# Patient Record
Sex: FEMALE | Race: WHITE | Employment: FULL TIME | URBAN - NONMETROPOLITAN AREA
[De-identification: names, ages, dates, MRNs, and addresses within clinical notes are randomized per-mention and may not be internally consistent; named-entity substitution may affect disease eponyms.]

---

## 2017-01-17 ENCOUNTER — TELEPHONE (OUTPATIENT)
Dept: NEUROLOGY | Age: 37
End: 2017-01-17

## 2017-01-19 ENCOUNTER — TELEPHONE (OUTPATIENT)
Dept: NEUROLOGY | Age: 37
End: 2017-01-19

## 2017-01-26 ENCOUNTER — TELEPHONE (OUTPATIENT)
Dept: NEUROSURGERY | Age: 37
End: 2017-01-26

## 2017-01-31 ENCOUNTER — OFFICE VISIT (OUTPATIENT)
Dept: NEUROSURGERY | Age: 37
End: 2017-01-31
Payer: COMMERCIAL

## 2017-01-31 VITALS
HEIGHT: 62 IN | WEIGHT: 202.2 LBS | DIASTOLIC BLOOD PRESSURE: 84 MMHG | SYSTOLIC BLOOD PRESSURE: 120 MMHG | BODY MASS INDEX: 37.21 KG/M2 | HEART RATE: 68 BPM

## 2017-01-31 DIAGNOSIS — R56.9 CONVULSIONS, UNSPECIFIED CONVULSION TYPE (HCC): ICD-10-CM

## 2017-01-31 DIAGNOSIS — R51.9 INTRACTABLE EPISODIC HEADACHE, UNSPECIFIED HEADACHE TYPE: Primary | ICD-10-CM

## 2017-01-31 PROCEDURE — 99214 OFFICE O/P EST MOD 30 MIN: CPT | Performed by: PSYCHIATRY & NEUROLOGY

## 2017-02-11 ENCOUNTER — HOSPITAL ENCOUNTER (EMERGENCY)
Age: 37
Discharge: HOME | End: 2017-02-11
Attending: EMERGENCY MEDICINE
Payer: COMMERCIAL

## 2017-02-11 ENCOUNTER — APPOINTMENT (OUTPATIENT)
Dept: CT IMAGING | Age: 37
End: 2017-02-11
Payer: COMMERCIAL

## 2017-02-11 VITALS
HEART RATE: 93 BPM | RESPIRATION RATE: 18 BRPM | BODY MASS INDEX: 37.17 KG/M2 | SYSTOLIC BLOOD PRESSURE: 127 MMHG | OXYGEN SATURATION: 95 % | TEMPERATURE: 98.6 F | HEIGHT: 62 IN | WEIGHT: 202 LBS | DIASTOLIC BLOOD PRESSURE: 78 MMHG

## 2017-02-11 DIAGNOSIS — G43.809 OTHER MIGRAINE WITHOUT STATUS MIGRAINOSUS, NOT INTRACTABLE: Primary | ICD-10-CM

## 2017-02-11 DIAGNOSIS — R55 SYNCOPE AND COLLAPSE: ICD-10-CM

## 2017-02-11 LAB
ALBUMIN SERPL-MCNC: 4.3 G/DL (ref 3.5–5.2)
ALP BLD-CCNC: 123 U/L (ref 35–104)
ALT SERPL-CCNC: 26 U/L (ref 5–33)
ANION GAP SERPL CALCULATED.3IONS-SCNC: 13 MMOL/L (ref 7–19)
AST SERPL-CCNC: 23 U/L (ref 5–32)
BASOPHILS ABSOLUTE: 0 K/UL (ref 0–0.2)
BASOPHILS RELATIVE PERCENT: 0.4 % (ref 0–1)
BILIRUB SERPL-MCNC: <0.2 MG/DL (ref 0.2–1.2)
BILIRUBIN URINE: NEGATIVE
BLOOD, URINE: NEGATIVE
BUN BLDV-MCNC: 13 MG/DL (ref 6–20)
CALCIUM SERPL-MCNC: 9.1 MG/DL (ref 8.6–10)
CHLORIDE BLD-SCNC: 100 MMOL/L (ref 98–111)
CLARITY: CLEAR
CO2: 25 MMOL/L (ref 22–29)
COLOR: YELLOW
CREAT SERPL-MCNC: 0.7 MG/DL (ref 0.5–0.9)
EOSINOPHILS ABSOLUTE: 0.3 K/UL (ref 0–0.6)
EOSINOPHILS RELATIVE PERCENT: 3 % (ref 0–5)
GFR NON-AFRICAN AMERICAN: >60
GLOBULIN: 3.3 G/DL
GLUCOSE BLD-MCNC: 85 MG/DL (ref 74–109)
GLUCOSE URINE: NEGATIVE MG/DL
HCT VFR BLD CALC: 47.1 % (ref 37–47)
HEMOGLOBIN: 15.2 G/DL (ref 12–16)
KETONES, URINE: NEGATIVE MG/DL
LEUKOCYTE ESTERASE, URINE: NEGATIVE
LYMPHOCYTES ABSOLUTE: 2.4 K/UL (ref 1.1–4.5)
LYMPHOCYTES RELATIVE PERCENT: 25.3 % (ref 20–40)
MCH RBC QN AUTO: 28.6 PG (ref 27–31)
MCHC RBC AUTO-ENTMCNC: 32.3 G/DL (ref 33–37)
MCV RBC AUTO: 88.7 FL (ref 81–99)
MONOCYTES ABSOLUTE: 0.7 K/UL (ref 0–0.9)
MONOCYTES RELATIVE PERCENT: 7.6 % (ref 0–10)
NEUTROPHILS ABSOLUTE: 5.9 K/UL (ref 1.5–7.5)
NEUTROPHILS RELATIVE PERCENT: 63.6 % (ref 50–65)
NITRITE, URINE: NEGATIVE
PDW BLD-RTO: 13 % (ref 11.5–14.5)
PH UA: 7.5
PLATELET # BLD: 293 K/UL (ref 130–400)
PMV BLD AUTO: 11 FL (ref 7.4–10.4)
POTASSIUM SERPL-SCNC: 3.9 MMOL/L (ref 3.5–5)
PROTEIN UA: NEGATIVE MG/DL
RBC # BLD: 5.31 M/UL (ref 4.2–5.4)
SODIUM BLD-SCNC: 138 MMOL/L (ref 136–145)
SPECIFIC GRAVITY UA: 1.03
TOTAL PROTEIN: 7.6 G/DL (ref 6.6–8.7)
UROBILINOGEN, URINE: 0.2 E.U./DL
WBC # BLD: 9.3 K/UL (ref 4.8–10.8)

## 2017-02-11 PROCEDURE — 70450 CT HEAD/BRAIN W/O DYE: CPT

## 2017-02-11 PROCEDURE — 85025 COMPLETE CBC W/AUTO DIFF WBC: CPT

## 2017-02-11 PROCEDURE — 2580000003 HC RX 258: Performed by: EMERGENCY MEDICINE

## 2017-02-11 PROCEDURE — 36415 COLL VENOUS BLD VENIPUNCTURE: CPT

## 2017-02-11 PROCEDURE — 99283 EMERGENCY DEPT VISIT LOW MDM: CPT | Performed by: EMERGENCY MEDICINE

## 2017-02-11 PROCEDURE — 99284 EMERGENCY DEPT VISIT MOD MDM: CPT

## 2017-02-11 PROCEDURE — 96374 THER/PROPH/DIAG INJ IV PUSH: CPT

## 2017-02-11 PROCEDURE — 6360000002 HC RX W HCPCS: Performed by: EMERGENCY MEDICINE

## 2017-02-11 PROCEDURE — 96375 TX/PRO/DX INJ NEW DRUG ADDON: CPT

## 2017-02-11 PROCEDURE — 81003 URINALYSIS AUTO W/O SCOPE: CPT

## 2017-02-11 PROCEDURE — 93005 ELECTROCARDIOGRAM TRACING: CPT

## 2017-02-11 PROCEDURE — 80053 COMPREHEN METABOLIC PANEL: CPT

## 2017-02-11 RX ORDER — 0.9 % SODIUM CHLORIDE 0.9 %
1000 INTRAVENOUS SOLUTION INTRAVENOUS ONCE
Status: COMPLETED | OUTPATIENT
Start: 2017-02-11 | End: 2017-02-11

## 2017-02-11 RX ORDER — KETOROLAC TROMETHAMINE 30 MG/ML
30 INJECTION, SOLUTION INTRAMUSCULAR; INTRAVENOUS ONCE
Status: COMPLETED | OUTPATIENT
Start: 2017-02-11 | End: 2017-02-11

## 2017-02-11 RX ORDER — DIPHENHYDRAMINE HYDROCHLORIDE 50 MG/ML
12.5 INJECTION INTRAMUSCULAR; INTRAVENOUS ONCE
Status: COMPLETED | OUTPATIENT
Start: 2017-02-11 | End: 2017-02-11

## 2017-02-11 RX ORDER — MORPHINE SULFATE 4 MG/ML
4 INJECTION, SOLUTION INTRAMUSCULAR; INTRAVENOUS ONCE
Status: COMPLETED | OUTPATIENT
Start: 2017-02-11 | End: 2017-02-11

## 2017-02-11 RX ORDER — DEXAMETHASONE SODIUM PHOSPHATE 10 MG/ML
10 INJECTION INTRAMUSCULAR; INTRAVENOUS ONCE
Status: COMPLETED | OUTPATIENT
Start: 2017-02-11 | End: 2017-02-11

## 2017-02-11 RX ADMIN — DEXAMETHASONE SODIUM PHOSPHATE 10 MG: 10 INJECTION INTRAMUSCULAR; INTRAVENOUS at 19:47

## 2017-02-11 RX ADMIN — PROCHLORPERAZINE EDISYLATE 10 MG: 5 INJECTION INTRAMUSCULAR; INTRAVENOUS at 18:40

## 2017-02-11 RX ADMIN — DIPHENHYDRAMINE HYDROCHLORIDE 12.5 MG: 50 INJECTION, SOLUTION INTRAMUSCULAR; INTRAVENOUS at 18:39

## 2017-02-11 RX ADMIN — MORPHINE SULFATE 4 MG: 4 INJECTION, SOLUTION INTRAMUSCULAR; INTRAVENOUS at 19:47

## 2017-02-11 RX ADMIN — KETOROLAC TROMETHAMINE 30 MG: 30 INJECTION, SOLUTION INTRAMUSCULAR at 18:39

## 2017-02-11 RX ADMIN — SODIUM CHLORIDE 1000 ML: 9 INJECTION, SOLUTION INTRAVENOUS at 18:39

## 2017-02-11 ASSESSMENT — PAIN DESCRIPTION - DESCRIPTORS: DESCRIPTORS: SHARP

## 2017-02-11 ASSESSMENT — PAIN SCALES - GENERAL
PAINLEVEL_OUTOF10: 8

## 2017-02-11 ASSESSMENT — PAIN DESCRIPTION - LOCATION: LOCATION: HEAD

## 2017-02-12 ENCOUNTER — PATIENT MESSAGE (OUTPATIENT)
Dept: NEUROSURGERY | Age: 37
End: 2017-02-12

## 2017-02-12 ASSESSMENT — ENCOUNTER SYMPTOMS
SHORTNESS OF BREATH: 0
ABDOMINAL PAIN: 0

## 2017-02-14 LAB
EKG P AXIS: 46 DEGREES
EKG P-R INTERVAL: 132 MS
EKG Q-T INTERVAL: 342 MS
EKG QRS DURATION: 84 MS
EKG QTC CALCULATION (BAZETT): 419 MS
EKG T AXIS: 31 DEGREES

## 2017-02-15 ENCOUNTER — HOSPITAL ENCOUNTER (OUTPATIENT)
Dept: NEUROLOGY | Age: 37
Discharge: HOME | End: 2017-02-15
Payer: COMMERCIAL

## 2017-02-15 PROCEDURE — 95819 EEG AWAKE AND ASLEEP: CPT | Performed by: PSYCHIATRY & NEUROLOGY

## 2017-02-15 PROCEDURE — 95819 EEG AWAKE AND ASLEEP: CPT

## 2017-02-24 ENCOUNTER — TELEPHONE (OUTPATIENT)
Dept: NEUROSURGERY | Age: 37
End: 2017-02-24

## 2017-03-14 ENCOUNTER — OFFICE VISIT (OUTPATIENT)
Dept: NEUROSURGERY | Age: 37
End: 2017-03-14
Payer: COMMERCIAL

## 2017-03-14 VITALS
SYSTOLIC BLOOD PRESSURE: 118 MMHG | DIASTOLIC BLOOD PRESSURE: 88 MMHG | HEIGHT: 62 IN | WEIGHT: 204.4 LBS | HEART RATE: 76 BPM | BODY MASS INDEX: 37.61 KG/M2

## 2017-03-14 DIAGNOSIS — R51.9 INTRACTABLE EPISODIC HEADACHE, UNSPECIFIED HEADACHE TYPE: Primary | ICD-10-CM

## 2017-03-14 DIAGNOSIS — R56.9 CONVULSIONS, UNSPECIFIED CONVULSION TYPE (HCC): ICD-10-CM

## 2017-03-14 PROCEDURE — 99214 OFFICE O/P EST MOD 30 MIN: CPT | Performed by: PSYCHIATRY & NEUROLOGY

## 2017-03-15 ENCOUNTER — TELEPHONE (OUTPATIENT)
Dept: NEUROLOGY | Age: 37
End: 2017-03-15

## 2017-05-17 ENCOUNTER — TELEPHONE (OUTPATIENT)
Dept: NEUROLOGY | Age: 37
End: 2017-05-17

## 2017-05-17 ENCOUNTER — OFFICE VISIT (OUTPATIENT)
Dept: NEUROSURGERY | Age: 37
End: 2017-05-17
Payer: COMMERCIAL

## 2017-05-17 VITALS
OXYGEN SATURATION: 99 % | HEIGHT: 62 IN | HEART RATE: 90 BPM | BODY MASS INDEX: 36.62 KG/M2 | WEIGHT: 199 LBS | SYSTOLIC BLOOD PRESSURE: 114 MMHG | DIASTOLIC BLOOD PRESSURE: 79 MMHG

## 2017-05-17 DIAGNOSIS — R56.9 CONVULSIONS, UNSPECIFIED CONVULSION TYPE (HCC): ICD-10-CM

## 2017-05-17 DIAGNOSIS — R51.9 INTRACTABLE EPISODIC HEADACHE, UNSPECIFIED HEADACHE TYPE: Primary | ICD-10-CM

## 2017-05-17 PROCEDURE — 99214 OFFICE O/P EST MOD 30 MIN: CPT | Performed by: PSYCHIATRY & NEUROLOGY

## 2017-05-17 RX ORDER — GABAPENTIN 300 MG/1
300 CAPSULE ORAL 3 TIMES DAILY
Qty: 90 CAPSULE | Refills: 5 | Status: SHIPPED | OUTPATIENT
Start: 2017-05-17 | End: 2017-10-18 | Stop reason: SDUPTHER

## 2017-05-18 ENCOUNTER — TELEPHONE (OUTPATIENT)
Dept: NEUROSURGERY | Age: 37
End: 2017-05-18

## 2017-05-23 ENCOUNTER — TELEPHONE (OUTPATIENT)
Dept: NEUROLOGY | Age: 37
End: 2017-05-23

## 2017-06-01 ENCOUNTER — TRANSCRIBE ORDERS (OUTPATIENT)
Dept: GENERAL RADIOLOGY | Facility: HOSPITAL | Age: 37
End: 2017-06-01

## 2017-06-01 ENCOUNTER — HOSPITAL ENCOUNTER (OUTPATIENT)
Dept: GENERAL RADIOLOGY | Facility: HOSPITAL | Age: 37
Discharge: HOME | End: 2017-06-01
Attending: INTERNAL MEDICINE | Admitting: INTERNAL MEDICINE

## 2017-06-01 DIAGNOSIS — M72.2 PLANTAR FASCIAL FIBROMATOSIS: Primary | ICD-10-CM

## 2017-06-01 PROCEDURE — 73620 X-RAY EXAM OF FOOT: CPT

## 2017-06-01 PROCEDURE — 73600 X-RAY EXAM OF ANKLE: CPT

## 2017-06-07 RX ORDER — VERAPAMIL HYDROCHLORIDE 40 MG/1
80 TABLET ORAL 2 TIMES DAILY
Qty: 120 TABLET | Refills: 5 | Status: SHIPPED | OUTPATIENT
Start: 2017-06-07 | End: 2017-12-02 | Stop reason: SDUPTHER

## 2017-06-21 ENCOUNTER — OFFICE VISIT (OUTPATIENT)
Dept: PODIATRY | Facility: CLINIC | Age: 37
End: 2017-06-21

## 2017-06-21 VITALS
HEART RATE: 82 BPM | WEIGHT: 204 LBS | BODY MASS INDEX: 37.54 KG/M2 | OXYGEN SATURATION: 98 % | DIASTOLIC BLOOD PRESSURE: 60 MMHG | SYSTOLIC BLOOD PRESSURE: 108 MMHG | HEIGHT: 62 IN

## 2017-06-21 DIAGNOSIS — M72.2 PLANTAR FASCIITIS, BILATERAL: Primary | ICD-10-CM

## 2017-06-21 PROCEDURE — 99203 OFFICE O/P NEW LOW 30 MIN: CPT | Performed by: PODIATRIST

## 2017-06-21 NOTE — PATIENT INSTRUCTIONS
Plantar Fasciitis With Rehab  The plantar fascia is a fibrous, ligament-like, soft-tissue structure that spans the bottom of the foot. Plantar fasciitis, also called heel spur syndrome, is a condition that causes pain in the foot due to inflammation of the tissue.  SYMPTOMS   · Pain and tenderness on the underneath side of the foot.  · Pain that worsens with standing or walking.  CAUSES   Plantar fasciitis is caused by irritation and injury to the plantar fascia on the underneath side of the foot. Common mechanisms of injury include:  · Direct trauma to bottom of the foot.  · Damage to a small nerve that runs under the foot where the main fascia attaches to the heel bone.  · Stress placed on the plantar fascia due to bone spurs.  RISK INCREASES WITH:   · Activities that place stress on the plantar fascia (running, jumping, pivoting, or cutting).  · Poor strength and flexibility.  · Improperly fitted shoes.  · Tight calf muscles.  · Flat feet.  · Failure to warm-up properly before activity.  · Obesity.  PREVENTION  · Warm up and stretch properly before activity.  · Allow for adequate recovery between workouts.  · Maintain physical fitness:    Strength, flexibility, and endurance.    Cardiovascular fitness.  · Maintain a health body weight.  · Avoid stress on the plantar fascia.  · Wear properly fitted shoes, including arch supports for individuals who have flat feet.  PROGNOSIS   If treated properly, then the symptoms of plantar fasciitis usually resolve without surgery. However, occasionally surgery is necessary.  RELATED COMPLICATIONS   · Recurrent symptoms that may result in a chronic condition.  · Problems of the lower back that are caused by compensating for the injury, such as limping.  · Pain or weakness of the foot during push-off following surgery.  · Chronic inflammation, scarring, and partial or complete fascia tear, occurring more often from repeated injections.  TREATMENT   Treatment initially involves  the use of ice and medication to help reduce pain and inflammation. The use of strengthening and stretching exercises may help reduce pain with activity, especially stretches of the Achilles tendon. These exercises may be performed at home or with a therapist. Your caregiver may recommend that you use heel cups of arch supports to help reduce stress on the plantar fascia. Occasionally, corticosteroid injections are given to reduce inflammation. If symptoms persist for greater than 6 months despite non-surgical (conservative), then surgery may be recommended.   MEDICATION   · If pain medication is necessary, then nonsteroidal anti-inflammatory medications, such as aspirin and ibuprofen, or other minor pain relievers, such as acetaminophen, are often recommended.  · Do not take pain medication within 7 days before surgery.  · Prescription pain relievers may be given if deemed necessary by your caregiver. Use only as directed and only as much as you need.  · Corticosteroid injections may be given by your caregiver. These injections should be reserved for the most serious cases, because they may only be given a certain number of times.  HEAT AND COLD  · Cold treatment (icing) relieves pain and reduces inflammation. Cold treatment should be applied for 10 to 15 minutes every 2 to 3 hours for inflammation and pain and immediately after any activity that aggravates your symptoms. Use ice packs or massage the area with a piece of ice (ice massage).  · Heat treatment may be used prior to performing the stretching and strengthening activities prescribed by your caregiver, physical therapist, or . Use a heat pack or soak the injury in warm water.  SEEK IMMEDIATE MEDICAL CARE IF:  · Treatment seems to offer no benefit, or the condition worsens.  · Any medications produce adverse side effects.  EXERCISES  RANGE OF MOTION (ROM) AND STRETCHING EXERCISES - Plantar Fasciitis (Heel Spur Syndrome)  These exercises may  help you when beginning to rehabilitate your injury. Your symptoms may resolve with or without further involvement from your physician, physical therapist or . While completing these exercises, remember:   · Restoring tissue flexibility helps normal motion to return to the joints. This allows healthier, less painful movement and activity.  · An effective stretch should be held for at least 30 seconds.  · A stretch should never be painful. You should only feel a gentle lengthening or release in the stretched tissue.  RANGE OF MOTION - Toe Extension, Flexion  · Sit with your right / left leg crossed over your opposite knee.  · Grasp your toes and gently pull them back toward the top of your foot. You should feel a stretch on the bottom of your toes and/or foot.  · Hold this stretch for __________ seconds.  · Now, gently pull your toes toward the bottom of your foot. You should feel a stretch on the top of your toes and or foot.  · Hold this stretch for __________ seconds.  Repeat __________ times. Complete this stretch __________ times per day.   RANGE OF MOTION - Ankle Dorsiflexion, Active Assisted  · Remove shoes and sit on a chair that is preferably not on a carpeted surface.  · Place right / left foot under knee. Extend your opposite leg for support.  · Keeping your heel down, slide your right / left foot back toward the chair until you feel a stretch at your ankle or calf. If you do not feel a stretch, slide your bottom forward to the edge of the chair, while still keeping your heel down.  · Hold this stretch for __________ seconds.  Repeat __________ times. Complete this stretch __________ times per day.   STRETCH - Gastroc, Standing  · Place hands on wall.  · Extend right / left leg, keeping the front knee somewhat bent.  · Slightly point your toes inward on your back foot.  · Keeping your right / left heel on the floor and your knee straight, shift your weight toward the wall, not allowing your  back to arch.  · You should feel a gentle stretch in the right / left calf. Hold this position for __________ seconds.  Repeat __________ times. Complete this stretch __________ times per day.  STRETCH - Soleus, Standing  · Place hands on wall.  · Extend right / left leg, keeping the other knee somewhat bent.  · Slightly point your toes inward on your back foot.  · Keep your right / left heel on the floor, bend your back knee, and slightly shift your weight over the back leg so that you feel a gentle stretch deep in your back calf.  · Hold this position for __________ seconds.  Repeat __________ times. Complete this stretch __________ times per day.  STRETCH - Gastrocsoleus, Standing   Note: This exercise can place a lot of stress on your foot and ankle. Please complete this exercise only if specifically instructed by your caregiver.   · Place the ball of your right / left foot on a step, keeping your other foot firmly on the same step.  · Hold on to the wall or a rail for balance.  · Slowly lift your other foot, allowing your body weight to press your heel down over the edge of the step.  · You should feel a stretch in your right / left calf.  · Hold this position for __________ seconds.  · Repeat this exercise with a slight bend in your right / left knee.  Repeat __________ times. Complete this stretch __________ times per day.   STRENGTHENING EXERCISES - Plantar Fasciitis (Heel Spur Syndrome)   These exercises may help you when beginning to rehabilitate your injury. They may resolve your symptoms with or without further involvement from your physician, physical therapist or . While completing these exercises, remember:   · Muscles can gain both the endurance and the strength needed for everyday activities through controlled exercises.  · Complete these exercises as instructed by your physician, physical therapist or . Progress the resistance and repetitions only as  guided.  STRENGTH - Towel Curls  · Sit in a chair positioned on a non-carpeted surface.  · Place your foot on a towel, keeping your heel on the floor.  · Pull the towel toward your heel by only curling your toes. Keep your heel on the floor.  · If instructed by your physician, physical therapist or , add ____________________ at the end of the towel.  Repeat __________ times. Complete this exercise __________ times per day.  STRENGTH - Ankle Inversion  · Secure one end of a rubber exercise band/tubing to a fixed object (table, pole). Loop the other end around your foot just before your toes.  · Place your fists between your knees. This will focus your strengthening at your ankle.  · Slowly, pull your big toe up and in, making sure the band/tubing is positioned to resist the entire motion.  · Hold this position for __________ seconds.  · Have your muscles resist the band/tubing as it slowly pulls your foot back to the starting position.  Repeat __________ times. Complete this exercises __________ times per day.      This information is not intended to replace advice given to you by your health care provider. Make sure you discuss any questions you have with your health care provider.     Document Released: 12/18/2006 Document Revised: 05/03/2016 Document Reviewed: 10/31/2016  Elsevier Interactive Patient Education ©2017 [employer]

## 2017-06-21 NOTE — PROGRESS NOTES
[facility] - PODIATRY    Today's Date: 2017    Patient Name: Taylor Young  MRN: [id number]  CSN: [id number]  PCP: Ramon Pabon DO  Referring Provider: No ref. provider found    SUBJECTIVE     Chief Complaint   Patient presents with   • Right Foot - Plantar Fasciitis     Pain level currently 8 out of 10   • Left Foot - Plantar Fasciitis     Pain level currently 4 out of 10     HPI: Taylor Young, a 37 y.o.female, comes to clinic as a(n) new patient complaining of bilateral heel pain. Pt has h/o migraines. Relates that for the past year she has had heel pain off and on. Admits doing some form of therapy but that it only gives her relief for a couple days. Denies icing, stretching or taking medication. Has tried OTC inserts and does not like them. Admits pain at 4-8/10 level and described as aching and throbbing. Denies any constitutional symptoms. No other pedal complaints at this time.    Past Medical History:   Diagnosis Date   • Plantar fasciitis      Past Surgical History:   Procedure Laterality Date   •  SECTION      x2   • HYSTEROSCOPY       History reviewed. No pertinent family history.  Social History     Social History   • Marital status:      Spouse name: N/A   • Number of children: N/A   • Years of education: N/A     Occupational History   • Not on file.     Social History Main Topics   • Smoking status: Never Smoker   • Smokeless tobacco: Never Used   • Alcohol use No   • Drug use: No   • Sexual activity: Defer     Other Topics Concern   • Not on file     Social History Narrative     No Known Allergies  Current Outpatient Prescriptions   Medication Sig Dispense Refill   • ALPRAZolam (XANAX) 0.25 MG tablet TAKE 1 TABLET BY MOUTH EVERY DAY     • butalbital-acetaminophen-caffeine (FIORICET, ESGIC) -40 MG per tablet Take 1 tablet by mouth Every 6 (Six) Hours As Needed for headaches. 15 tablet 0   • gabapentin (NEURONTIN) 300 MG capsule Take 300 mg by mouth 3  (Three) Times a Day.     • venlafaxine XR (EFFEXOR-XR) 75 MG 24 hr capsule      • verapamil (CALAN) 40 MG tablet Take 80 mg by mouth 2 (Two) Times a Day.     • lamoTRIgine (LaMICtal) 25 MG tablet Take 25 mg by mouth 2 (Two) Times a Day.     • promethazine (PHENERGAN) 25 MG tablet Take 1 tablet by mouth Every 6 (Six) Hours As Needed for nausea or vomiting. 12 tablet 0   • venlafaxine (EFFEXOR) 75 MG tablet Take 75 mg by mouth 2 (Two) Times a Day.       No current facility-administered medications for this visit.      Review of Systems   Constitutional: Negative for chills and fever.   HENT: Negative for congestion.    Respiratory: Negative for shortness of breath.    Cardiovascular: Negative for chest pain and leg swelling.   Gastrointestinal: Negative for constipation, diarrhea, nausea and vomiting.   Musculoskeletal:        Heel pain   Skin: Negative for wound.   Neurological: Negative for numbness.       OBJECTIVE     Vitals:    06/21/17 0917   BP: 108/60   Pulse: 82   SpO2: 98%       PHYSICAL EXAM  GEN:   A&Ox3, NAD. Pt presents to clinic ambulating without assistance and wearing Casual Shoes.      NEURO:   Protective sensation intact to 10/10 sites Right foot, 10/10 site Left foot using [place]-Eulogio monofilament  Light touch sensation present  No Tinel's or Villeux sign.    VASC:  Skin temperature Warm to Warm proximal to distal jacki  DP pulses 2/4 Right, 2/4 Left  PT pulses 2/4 Right, 2/4 Left  CFT <3 sec jacki  Pedal hair growth present  trace edema noted jacki  Varicosities absent jacki    MUSC/SKEL:  Muscle Strength Right foot Dorsiflexors 5/5, Plantarflexors 5/5, Evertors 5/5, Invertors 5/5  Muscle Strength Left foot Dorsiflexors 5/5, Plantarflexors 5/5, Evertors 5/5, Invertors 5/5  ROM of the 1st MTP is full without pain or crepitus  ROM of the MTJ is full without pain or crepitus    ROM of the STJ is full without pain or crepitus    ROM of the ankle joint is full without pain or crepitus    POP of plantar  medial calcaneal tubercle jacki. No pain along achilles tendon or medial band of plantar fascia  Rectus foot type   Gait pattern: Normal  No gross pedal musculoskeletal deformities noted.     DERM:  Pedal nails x10 are within normal limits of length and thickness  Webspaces are Clean, Dry, and Intact  Skin is normal in turgor and texture with no open wounds or sores appreciated.      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 Right ankle pain.  COMPARISON: No comparison study.  TECHNIQUE: 2 views were obtained.  FINDINGS: There is no evidence of fracture or dislocation. The ankle joint space is well maintained. The bones are well-mineralized.      Impression: No acute bony abnormality.   This report was finalized on 06/01/2017 09:50 by Dr. Jose Branham MD.    Xr Foot 2 View Right    Result Date: 6/1/2017  Narrative: EXAMINATION:  XR FOOT 2 VW RIGHT-  6/1/2017 8:41 CST  HISTORY: M72.2; M72.2-Plantar fascial fibromatosis.  COMPARISON: No comparison study.  TECHNIQUE: 2 views were obtained.  FINDINGS:  There is mild motion artifact on the AP image. There is a corticated bone fragment medially adjacent to the distal talus. This is likely an accessory ossicle. Joint spaces appear fairly well-preserved. There is no fracture or bone destruction.      Impression: The study is slightly limited by motion. No acute bony abnormality is seen.  This report was finalized on 06/01/2017 09:51 by Dr. Jose Branham MD.      LABORATORY/CULTURE RESULTS:      PATHOLOGY RESULTS:       ASSESSMENT/PLAN     Taylor was seen today for plantar fasciitis and plantar fasciitis.    Diagnoses and all orders for this visit:    Plantar fasciitis, bilateral    Comprehensive lower extremity examination and evaluation was performed.  Discussed findings and treatment plan including risks, benefits, and treatment options  with patient in detail. Patient agreed with treatment plan.  Advised pt on conservative treatment for PL including daily stretching, icing, supportive shoes, and NSAIDs PRN   If pt has ongoing pain at f/u, steroid injection will be offered  An After Visit Summary was printed and given to the patient at discharge, including (if requested) any available informative/educational handouts regarding diagnosis, treatment, or medications. All questions were answered to patient/family satisfaction. Should symptoms fail to improve or worsen they agree to call or return to clinic or to go to the Emergency Department. Discussed the importance of following up with any needed screening tests/labs/specialist appointments and any requested follow-up recommended by me today. Importance of maintaining follow-up discussed and patient accepts that missed appointments can delay diagnosis and potentially lead to worsening of conditions.  Return in about 1 month (around 7/21/2017)., or sooner if acute issues arise.        This document has been electronically signed by Andriy Luke DPM on June 21, 2017 9:40 AM

## 2017-07-21 ENCOUNTER — OFFICE VISIT (OUTPATIENT)
Dept: PODIATRY | Facility: CLINIC | Age: 37
End: 2017-07-21

## 2017-07-21 VITALS
HEIGHT: 62 IN | BODY MASS INDEX: 37.73 KG/M2 | DIASTOLIC BLOOD PRESSURE: 86 MMHG | HEART RATE: 90 BPM | WEIGHT: 205 LBS | OXYGEN SATURATION: 99 % | SYSTOLIC BLOOD PRESSURE: 110 MMHG

## 2017-07-21 DIAGNOSIS — M72.2 PLANTAR FASCIITIS, BILATERAL: Primary | ICD-10-CM

## 2017-07-21 PROCEDURE — 20550 NJX 1 TENDON SHEATH/LIGAMENT: CPT | Performed by: PODIATRIST

## 2017-07-21 PROCEDURE — 99212 OFFICE O/P EST SF 10 MIN: CPT | Performed by: PODIATRIST

## 2017-07-21 RX ORDER — DEXAMETHASONE SODIUM PHOSPHATE 10 MG/ML
10 INJECTION, SOLUTION INTRAMUSCULAR; INTRAVENOUS ONCE
Status: COMPLETED | OUTPATIENT
Start: 2017-07-21 | End: 2017-07-21

## 2017-07-21 RX ORDER — TRIAMCINOLONE ACETONIDE 40 MG/ML
20 INJECTION, SUSPENSION INTRA-ARTICULAR; INTRAMUSCULAR ONCE
Status: COMPLETED | OUTPATIENT
Start: 2017-07-21 | End: 2017-07-21

## 2017-07-21 RX ORDER — BUPIVACAINE HYDROCHLORIDE 5 MG/ML
1.5 INJECTION, SOLUTION PERINEURAL ONCE
Status: COMPLETED | OUTPATIENT
Start: 2017-07-21 | End: 2017-07-21

## 2017-07-21 RX ADMIN — BUPIVACAINE HYDROCHLORIDE 1.5 ML: 5 INJECTION, SOLUTION PERINEURAL at 13:55

## 2017-07-21 RX ADMIN — TRIAMCINOLONE ACETONIDE 20 MG: 40 INJECTION, SUSPENSION INTRA-ARTICULAR; INTRAMUSCULAR at 14:00

## 2017-07-21 RX ADMIN — TRIAMCINOLONE ACETONIDE 20 MG: 40 INJECTION, SUSPENSION INTRA-ARTICULAR; INTRAMUSCULAR at 13:59

## 2017-07-21 RX ADMIN — DEXAMETHASONE SODIUM PHOSPHATE 10 MG: 10 INJECTION, SOLUTION INTRAMUSCULAR; INTRAVENOUS at 13:57

## 2017-07-21 RX ADMIN — BUPIVACAINE HYDROCHLORIDE 1.5 ML: 5 INJECTION, SOLUTION PERINEURAL at 13:56

## 2017-07-21 RX ADMIN — DEXAMETHASONE SODIUM PHOSPHATE 10 MG: 10 INJECTION, SOLUTION INTRAMUSCULAR; INTRAVENOUS at 13:58

## 2017-07-21 NOTE — PROGRESS NOTES
Procedure   Injection Tendon or Ligament  Date/Time: 7/21/2017 5:02 PM  Performed by: YOLA MCKINNON  Authorized by: YOLA MCKINNON   Preparation: Patient was prepped and draped in the usual sterile fashion.  Local anesthesia used: yes  Anesthesia: local infiltration    Anesthesia:  Local anesthesia used: yes  Local Anesthetic: bupivacaine 0.5% without epinephrine  Anesthetic total: 1.5 mL    Sedation:  Patient sedated: no  Patient tolerance: Patient tolerated the procedure well with no immediate complications  Comments: 1cc Dex, 0.5cc Kenalog 40 to right plantar fascia from medial approach.

## 2017-07-21 NOTE — PROGRESS NOTES
[facility] - PODIATRY    Today's Date: 17    Patient Name: Taylor Young  MRN: [id number]  CSN: [id number]  PCP: Ramon Pabon DO  Referring Provider: No ref. provider found    SUBJECTIVE     Chief Complaint   Patient presents with   • Right Foot - Wound Check     Patient is complaining of pain 9/10 on a pain scale. She describes the pain as sharp and stabbing.    • Left Foot - Pain   • Follow-up   • Plantar Fasciitis     HPI: Taylor Young, a 37 y.o.female, comes to clinic as a(n) new patient complaining of bilateral heel pain. Pt has h/o migraines. Denies change in medical hx since last appt. Has been stretching, icing, and taking OTC medication. Says she is interested in injections for pain relief. Admits pain at 9/10 level and described as stabbing and sharp. Denies any constitutional symptoms. No other pedal complaints at this time.    Past Medical History:   Diagnosis Date   • Plantar fasciitis      Past Surgical History:   Procedure Laterality Date   •  SECTION      x2   • HYSTEROSCOPY       Family History   Problem Relation Age of Onset   • Ataxia Mother    • Hypertension Father    • COPD Father      Social History     Social History   • Marital status:      Spouse name: N/A   • Number of children: N/A   • Years of education: N/A     Occupational History   • Not on file.     Social History Main Topics   • Smoking status: Never Smoker   • Smokeless tobacco: Never Used   • Alcohol use No   • Drug use: No   • Sexual activity: Defer     Other Topics Concern   • Not on file     Social History Narrative     No Known Allergies  Current Outpatient Prescriptions   Medication Sig Dispense Refill   • ALPRAZolam (XANAX) 0.25 MG tablet TAKE 1 TABLET BY MOUTH EVERY DAY     • butalbital-acetaminophen-caffeine (FIORICET, ESGIC) -40 MG per tablet Take 1 tablet by mouth Every 6 (Six) Hours As Needed for headaches. 15 tablet 0   • gabapentin (NEURONTIN) 300 MG capsule Take 300 mg  by mouth 3 (Three) Times a Day.     • venlafaxine (EFFEXOR) 75 MG tablet Take 75 mg by mouth 2 (Two) Times a Day.     • verapamil (CALAN) 40 MG tablet Take 80 mg by mouth 2 (Two) Times a Day.       No current facility-administered medications for this visit.      Review of Systems   Constitutional: Negative for chills and fever.   HENT: Negative for congestion.    Respiratory: Negative for shortness of breath.    Cardiovascular: Negative for chest pain and leg swelling.   Gastrointestinal: Negative for constipation, diarrhea, nausea and vomiting.   Musculoskeletal:        Heel pain   Skin: Negative for wound.   Neurological: Negative for numbness.       OBJECTIVE     Vitals:    07/21/17 1316   BP: 110/86   Pulse: 90   SpO2: 99%       PHYSICAL EXAM  GEN:   A&Ox3, NAD. Pt presents to clinic ambulating without assistance and wearing Casual Shoes.      NEURO:   Protective sensation intact to 10/10 sites Right foot, 10/10 site Left foot using [place]-Eulogio monofilament  Light touch sensation present  No Tinel's or Villeux sign.    VASC:  Skin temperature Warm to Warm proximal to distal jacki  DP pulses 2/4 Right, 2/4 Left  PT pulses 2/4 Right, 2/4 Left  CFT <3 sec jacki  Pedal hair growth present  trace edema noted jacki  Varicosities absent jacki    MUSC/SKEL:  Muscle Strength Right foot Dorsiflexors 5/5, Plantarflexors 5/5, Evertors 5/5, Invertors 5/5  Muscle Strength Left foot Dorsiflexors 5/5, Plantarflexors 5/5, Evertors 5/5, Invertors 5/5  ROM of the 1st MTP is full without pain or crepitus  ROM of the MTJ is full without pain or crepitus    ROM of the STJ is full without pain or crepitus    ROM of the ankle joint is full without pain or crepitus    POP of plantar medial calcaneal tubercle jacki. No pain along achilles tendon or medial band of plantar fascia  Rectus foot type   Gait pattern: Normal  No gross pedal musculoskeletal deformities noted.     DERM:  Pedal nails x10 are within normal limits of length and  thickness  Webspaces are Clean, Dry, and Intact  Skin is normal in turgor and texture with no open wounds or sores appreciated.      RADIOLOGY/NUCLEAR:  No results found.    LABORATORY/CULTURE RESULTS:      PATHOLOGY RESULTS:       ASSESSMENT/PLAN     Taylor was seen today for follow-up, plantar fasciitis, wound check and pain.    Diagnoses and all orders for this visit:    Plantar fasciitis, bilateral  -     Injection Tendon or Ligament  -     Injection Tendon or Ligament  -     bupivacaine (MARCAINE) injection 1.5 mL; Inject 1.5 mL as directed 1 (One) Time.  -     bupivacaine (MARCAINE) injection 1.5 mL; Inject 1.5 mL as directed 1 (One) Time.  -     triamcinolone acetonide (KENALOG-40) injection 20 mg; Inject 0.5 mL into the shoulder, thigh, or buttocks 1 (One) Time.  -     triamcinolone acetonide (KENALOG-40) injection 20 mg; Inject 0.5 mL into the shoulder, thigh, or buttocks 1 (One) Time.  -     dexamethasone sodium phosphate injection 10 mg; Inject 1 mL into the shoulder, thigh, or buttocks 1 (One) Time.  -     dexamethasone sodium phosphate injection 10 mg; Inject 1 mL into the shoulder, thigh, or buttocks 1 (One) Time.      Comprehensive lower extremity examination and evaluation was performed.  Discussed findings and treatment plan including risks, benefits, and treatment options with patient in detail. Patient agreed with treatment plan.  Continue with conservative treatment for PF including daily stretching, icing, supportive shoes, and NSAIDs PRN   After written consent obtained, jacki heel injection performed as documented in procedure notes  An After Visit Summary was printed and given to the patient at discharge, including (if requested) any available informative/educational handouts regarding diagnosis, treatment, or medications. All questions were answered to patient/family satisfaction. Should symptoms fail to improve or worsen they agree to call or return to clinic or to go to the Emergency  Department. Discussed the importance of following up with any needed screening tests/labs/specialist appointments and any requested follow-up recommended by me today. Importance of maintaining follow-up discussed and patient accepts that missed appointments can delay diagnosis and potentially lead to worsening of conditions.  Return in about 1 month (around 8/21/2017)., or sooner if acute issues arise.        This document has been electronically signed by Andriy Luke DPM on July 21, 2017 4:59 PM

## 2017-07-21 NOTE — PROGRESS NOTES
Procedure   Injection Tendon or Ligament  Date/Time: 7/21/2017 5:03 PM  Performed by: YOLA MCKINNON  Authorized by: YOLA MCKINNON   Preparation: Patient was prepped and draped in the usual sterile fashion.  Local anesthesia used: yes  Anesthesia: local infiltration    Anesthesia:  Local anesthesia used: yes  Local Anesthetic: bupivacaine 0.5% without epinephrine  Anesthetic total: 1.5 mL    Sedation:  Patient sedated: no  Patient tolerance: Patient tolerated the procedure well with no immediate complications  Comments: 1cc Dex, 0.5cc Kenalog 40 to left plantar fascia from medial approach.

## 2017-07-28 RX ORDER — BUTALBITAL, ACETAMINOPHEN AND CAFFEINE 50; 325; 40 MG/1; MG/1; MG/1
1 CAPSULE ORAL 3 TIMES DAILY PRN
Qty: 40 CAPSULE | Refills: 3 | Status: SHIPPED | OUTPATIENT
Start: 2017-07-28 | End: 2018-10-16 | Stop reason: SDUPTHER

## 2017-07-31 ENCOUNTER — TELEPHONE (OUTPATIENT)
Dept: NEUROSURGERY | Age: 37
End: 2017-07-31

## 2017-08-02 ENCOUNTER — OFFICE VISIT (OUTPATIENT)
Dept: NEUROSURGERY | Age: 37
End: 2017-08-02
Payer: COMMERCIAL

## 2017-08-02 VITALS
DIASTOLIC BLOOD PRESSURE: 74 MMHG | HEIGHT: 62 IN | HEART RATE: 90 BPM | SYSTOLIC BLOOD PRESSURE: 139 MMHG | OXYGEN SATURATION: 96 % | WEIGHT: 206 LBS | BODY MASS INDEX: 37.91 KG/M2

## 2017-08-02 PROCEDURE — 64615 CHEMODENERV MUSC MIGRAINE: CPT | Performed by: PSYCHIATRY & NEUROLOGY

## 2017-09-19 ENCOUNTER — OFFICE VISIT (OUTPATIENT)
Dept: NEUROSURGERY | Age: 37
End: 2017-09-19
Payer: COMMERCIAL

## 2017-09-19 VITALS
DIASTOLIC BLOOD PRESSURE: 79 MMHG | WEIGHT: 208 LBS | BODY MASS INDEX: 38.28 KG/M2 | SYSTOLIC BLOOD PRESSURE: 122 MMHG | HEIGHT: 62 IN | HEART RATE: 87 BPM | OXYGEN SATURATION: 95 %

## 2017-09-19 DIAGNOSIS — R56.9 CONVULSIONS, UNSPECIFIED CONVULSION TYPE (HCC): ICD-10-CM

## 2017-09-19 PROCEDURE — 99214 OFFICE O/P EST MOD 30 MIN: CPT | Performed by: PSYCHIATRY & NEUROLOGY

## 2017-10-06 ENCOUNTER — TELEPHONE (OUTPATIENT)
Dept: VASCULAR SURGERY | Facility: CLINIC | Age: 37
End: 2017-10-06

## 2017-10-06 NOTE — TELEPHONE ENCOUNTER
Patient called complaint of right ankle swelling for past few weeks.Denies injury.Scheduled appointment for follow up with Dr. Luke.Seen at [facility] yesterday.

## 2017-10-19 NOTE — TELEPHONE ENCOUNTER
Patient requests script for gabapentin 300mg TID. Kenna Shape in chart 9-17-17, last office visit 9-19-17, next office visit 11-8-17.

## 2017-10-20 RX ORDER — GABAPENTIN 300 MG/1
300 CAPSULE ORAL 3 TIMES DAILY
Qty: 90 CAPSULE | Refills: 5 | Status: SHIPPED | OUTPATIENT
Start: 2017-10-20 | End: 2018-05-23 | Stop reason: SDUPTHER

## 2017-10-30 ENCOUNTER — TELEPHONE (OUTPATIENT)
Dept: NEUROSURGERY | Age: 37
End: 2017-10-30

## 2017-11-08 ENCOUNTER — PROCEDURE VISIT (OUTPATIENT)
Dept: NEUROSURGERY | Age: 37
End: 2017-11-08
Payer: COMMERCIAL

## 2017-11-08 VITALS
HEART RATE: 82 BPM | WEIGHT: 214 LBS | DIASTOLIC BLOOD PRESSURE: 77 MMHG | BODY MASS INDEX: 39.38 KG/M2 | SYSTOLIC BLOOD PRESSURE: 138 MMHG | OXYGEN SATURATION: 95 % | HEIGHT: 62 IN

## 2017-11-08 PROCEDURE — 64615 CHEMODENERV MUSC MIGRAINE: CPT | Performed by: PSYCHIATRY & NEUROLOGY

## 2017-12-04 RX ORDER — VERAPAMIL HYDROCHLORIDE 40 MG/1
TABLET ORAL
Qty: 120 TABLET | Refills: 5 | Status: SHIPPED | OUTPATIENT
Start: 2017-12-04 | End: 2018-06-08 | Stop reason: DRUGHIGH

## 2017-12-20 ENCOUNTER — OFFICE VISIT (OUTPATIENT)
Dept: NEUROSURGERY | Age: 37
End: 2017-12-20
Payer: COMMERCIAL

## 2017-12-20 VITALS
SYSTOLIC BLOOD PRESSURE: 112 MMHG | BODY MASS INDEX: 39.01 KG/M2 | HEIGHT: 62 IN | HEART RATE: 100 BPM | WEIGHT: 212 LBS | DIASTOLIC BLOOD PRESSURE: 64 MMHG

## 2017-12-20 DIAGNOSIS — R56.9 CONVULSIONS, UNSPECIFIED CONVULSION TYPE (HCC): ICD-10-CM

## 2017-12-20 DIAGNOSIS — R51.9 INTRACTABLE EPISODIC HEADACHE, UNSPECIFIED HEADACHE TYPE: ICD-10-CM

## 2017-12-20 PROCEDURE — 99214 OFFICE O/P EST MOD 30 MIN: CPT | Performed by: PSYCHIATRY & NEUROLOGY

## 2017-12-20 NOTE — PROGRESS NOTES
History     Social History    Marital status:      Spouse name: N/A    Number of children: N/A    Years of education: N/A     Occupational History    Not on file. Social History Main Topics    Smoking status: Never Smoker    Smokeless tobacco: Never Used    Alcohol use No    Drug use: No    Sexual activity: Yes     Partners: Male     Other Topics Concern    Not on file     Social History Narrative    No narrative on file       Current Outpatient Prescriptions   Medication Sig Dispense Refill    verapamil (CALAN) 40 MG tablet TAKE TWO TABLETS BY MOUTH TWO TIMES A  tablet 5    gabapentin (NEURONTIN) 300 MG capsule Take 1 capsule by mouth 3 times daily 90 capsule 5    butalbital-apap-caffeine -40 MG CAPS Take 1 capsule by mouth 3 times daily as needed for Headaches 40 capsule 3    ALPRAZolam (XANAX) 0.25 MG tablet TAKE 1 TABLET BY MOUTH EVERY DAY  2    venlafaxine (EFFEXOR XR) 75 MG extended release capsule Take 75 mg by mouth daily        No current facility-administered medications for this visit.       ALLERGIES  No Known Allergies      REVIEW OF SYSTEMS    Constitutional: []Fever []Sweats []Chills [] Recent Injury []Fatigue  [x] Denies all unless marked  HEENT:[x]Headache  [] Head Injury  [] Sore Throat  [] Ear Pain  [x]Dizziness [] Hearing Loss []Trouble Swallowing []Voice Change  [] Eye Pain  [] Eye Injection []Visual Disturbance  [] Ptosis  [] Tinnitus [x] Denies all unless marked  Spine:  [] Neck pain  [] Back pain  [] Sciaticia  [x] Denies all unless marked  Cardiovascular:[]Chest Pain []Palpitations [] Heart Disease  [x] Denies all unless marked  Pulmonary: []Shortness of Breath []Cough  []Wheezing  [x] Denies all unless marked  Gastrointestinal:  []Abdominal Pain  []Blood in Stool  []Diarrhea []Constipation []Nausea  []Vomiting  [x] Denies all unless marked  Genitourinary:  [] Dysuria [] Enuresis [] Incontinence [] Frequency/Urgency  [] Hematuria  [x] Denies all unless marked  Musculoskeletal: [] Joint Pain [] Myalgias [] Joint Swelling [] Neck Stiffness  [x] Denies all unless marked  Skin:[] Rash [] Pallor [] Color Change [] Wound  [x] Denies all unless marked  Neurological:[] Visual Disturbance [] Double Vision [] Slurred Speech [] Trouble swallowing  [x] Vertigo [] Tingling [] Numbness [] Weakness [] Loss of Balance [] Loss of Consciousness [] Memory Loss [] Tremor [] Seizure [] Syncope  [] Ataxia  [x] Denies all unless marked  Psychiatric/Behavioral:[] Depression [x] Anxiety [] Confusion [] Agitation [] Behavior Problems  [] Hallucinations  [] Suicidal idiation  [x] Denies all unless marked  Sleep: [x]  Insomnia [] Sleep Disturbance [] Snoring [] Restless Legs [] Daytime Sleeping [] Sleep Apnea  [x] Denies all unless marked  Hematological:[] Adenopathy [] Bruises/Bleeds Easily  [x] Denies all unless marked  Endocrine: [] Cold Intolerance [] Heat Intolerance [] Polydipsia [] Polyphagia [] Polyuria  [x]Denies all unless marked  Allergic/Immunologic:[] Environmental Allergies [] Food Allergies [] Immunocompromised state  [x] Denies all unless marked    PHYSICAL EXAM  /64   Ht 5' 2\" (1.575 m)   Wt 212 lb (96.2 kg)   BMI 38.78 kg/m²       Constitutional  No acute distress    HEENT- Conjunctiva normal.  No scars, masses, or lesions over external nose or ears  Musculoskeletal  No significant wasting of muscles noted, no bony deformities  Extremities - No clubbing, cyanosis or edema  Skin  Warm, dry, and intact. No rash, erythema, or pallor  Psychiatric  Mood, affect, and behavior appear normal      NEUROLOGICAL EXAM    Mental status   [x]Awake, alert, oriented   [x]Affect attention and concentration appear appropriate  [x]Recent and remote memory appears unremarkable  [x]Speech normal without dysarthria or aphasia, comprehension and repetition intact.    COMMENTS:    Cranial Nerves [x]No VF deficit to confrontation,  no papilledema on fundoscopic exam.  [x]JAIRON

## 2018-01-03 ENCOUNTER — TELEPHONE (OUTPATIENT)
Dept: NEUROLOGY | Age: 38
End: 2018-01-03

## 2018-01-31 ENCOUNTER — PROCEDURE VISIT (OUTPATIENT)
Dept: NEUROSURGERY | Age: 38
End: 2018-01-31
Payer: COMMERCIAL

## 2018-01-31 VITALS
WEIGHT: 209.8 LBS | HEIGHT: 62 IN | BODY MASS INDEX: 38.61 KG/M2 | SYSTOLIC BLOOD PRESSURE: 120 MMHG | OXYGEN SATURATION: 96 % | HEART RATE: 77 BPM | DIASTOLIC BLOOD PRESSURE: 60 MMHG

## 2018-01-31 PROCEDURE — 64615 CHEMODENERV MUSC MIGRAINE: CPT | Performed by: PSYCHIATRY & NEUROLOGY

## 2018-02-08 ENCOUNTER — TELEPHONE (OUTPATIENT)
Dept: NEUROSURGERY | Age: 38
End: 2018-02-08

## 2018-02-08 NOTE — TELEPHONE ENCOUNTER
Patient called today wanting to let you know Dr. Chauncey Rich changed her Verapamil to 240mg once a day. She was taking 40mg 2 tablets BID. Her BP was elevated at her appointment today with him so he changed it and she is wanting to make sure it is ok with you?

## 2018-03-14 ENCOUNTER — OFFICE VISIT (OUTPATIENT)
Dept: NEUROSURGERY | Age: 38
End: 2018-03-14
Payer: COMMERCIAL

## 2018-03-14 VITALS
SYSTOLIC BLOOD PRESSURE: 124 MMHG | BODY MASS INDEX: 37.91 KG/M2 | HEIGHT: 62 IN | OXYGEN SATURATION: 98 % | WEIGHT: 206 LBS | DIASTOLIC BLOOD PRESSURE: 64 MMHG | HEART RATE: 99 BPM

## 2018-03-14 DIAGNOSIS — R51.9 INTRACTABLE EPISODIC HEADACHE, UNSPECIFIED HEADACHE TYPE: ICD-10-CM

## 2018-03-14 DIAGNOSIS — R56.9 CONVULSIONS, UNSPECIFIED CONVULSION TYPE (HCC): ICD-10-CM

## 2018-03-14 PROCEDURE — 99214 OFFICE O/P EST MOD 30 MIN: CPT | Performed by: PSYCHIATRY & NEUROLOGY

## 2018-03-14 NOTE — PROGRESS NOTES
[id number] [facility] Neurology Office Note      Patient:   Jose Alberto Lopez  MR#:    [id number]  Account Number:                         YOB: 1980  Date of Evaluation:  3/14/2018  Time of Note:                          3:32 PM  Primary/Referring Physician:  Tre Gutierrez DO   Consulting Physician:  Jian Kinsey D.O.    FOLLOW UP VISIT    Chief Complaint   Patient presents with    Migraine     6 week follow up patient stated that her haedaches have improved since Botox. Reports maybe 2-3 headaches. HISTORY OF PRESENT ILLNESS    Here for follow up. Headaches are much improved overall, s/p botox. Severity much improved. Did have a recurrent syncopal event in association with the headaches since last seen, brief event, has a history of hemiplegic migraine. Patient noting worsening headaches over the last few years. Hospitalized at [facility] previously with right sided weakness associated with headache and diagnosed with hemiplegic migraine. Headache frequency is much improved since last seen. Still noting frontal pain, last hours to days, photophobia, nausea, throbbing pain. Notes scotoma like visual changes with the headaches \"spots\" in her vision with her headaches. Tried topamax, not effective. On verapamil. Not overly helpful. Taking esgic prn. Off Lamictal.  Not currently on a triptan. Last MRI was at [facility] a year ago. CT head at [facility], negative. Patient is having 15 headache days a month lasting 4 hours previously prior to botox. Reporting 3 headache days per month lasting 2-3 hours since Botox. On neurontin now as well, which may be helping. No other complaints today. Doing better on botox. Also on neurontin as well.      Past Medical History:   Diagnosis Date    Anxiety     Depression     Hypertension     Migraine        Past Surgical History:   Procedure Laterality Date     SECTION      HYSTERECTOMY Family History   Problem Relation Age of Onset    Ataxia Mother     High Blood Pressure Mother     High Blood Pressure Father     COPD Father        Social History     Social History    Marital status:      Spouse name: N/A    Number of children: N/A    Years of education: N/A     Occupational History    Not on file. Social History Main Topics    Smoking status: Never Smoker    Smokeless tobacco: Never Used    Alcohol use No    Drug use: No    Sexual activity: Yes     Partners: Male     Other Topics Concern    Not on file     Social History Narrative    No narrative on file       Current Outpatient Prescriptions   Medication Sig Dispense Refill    verapamil (CALAN) 40 MG tablet TAKE TWO TABLETS BY MOUTH TWO TIMES A  tablet 5    gabapentin (NEURONTIN) 300 MG capsule Take 1 capsule by mouth 3 times daily 90 capsule 5    butalbital-apap-caffeine -40 MG CAPS Take 1 capsule by mouth 3 times daily as needed for Headaches 40 capsule 3    ALPRAZolam (XANAX) 0.25 MG tablet TAKE 1 TABLET BY MOUTH EVERY DAY  2    venlafaxine (EFFEXOR XR) 75 MG extended release capsule Take 75 mg by mouth daily        No current facility-administered medications for this visit.       ALLERGIES  No Known Allergies      REVIEW OF SYSTEMS    Constitutional: []Fever []Sweats []Chills [] Recent Injury []Fatigue  [x] Denies all unless marked  HEENT:[x]Headache  [] Head Injury  [] Sore Throat  [] Ear Pain  [x]Dizziness [] Hearing Loss []Trouble Swallowing []Voice Change  [] Eye Pain  [] Eye Injection []Visual Disturbance  [] Ptosis  [] Tinnitus [x] Denies all unless marked  Spine:  [] Neck pain  [] Back pain  [] Sciaticia  [x] Denies all unless marked  Cardiovascular:[]Chest Pain []Palpitations [] Heart Disease  [x] Denies all unless marked  Pulmonary: []Shortness of Breath []Cough  []Wheezing  [x] Denies all unless marked  Gastrointestinal:  []Abdominal Pain  []Blood in Stool  []Diarrhea []Constipation []Nausea  []Vomiting  [x] Denies all unless marked  Genitourinary:  [] Dysuria [] Enuresis [] Incontinence [] Frequency/Urgency  [] Hematuria  [x] Denies all unless marked  Musculoskeletal: [] Joint Pain [] Myalgias [] Joint Swelling [] Neck Stiffness  [x] Denies all unless marked  Skin:[] Rash [] Pallor [] Color Change [] Wound  [x] Denies all unless marked  Neurological:[] Visual Disturbance [] Double Vision [] Slurred Speech [] Trouble swallowing  [x] Vertigo [] Tingling [] Numbness [] Weakness [] Loss of Balance [] Loss of Consciousness [] Memory Loss [] Tremor [] Seizure [] Syncope  [] Ataxia  [x] Denies all unless marked  Psychiatric/Behavioral:[] Depression [x] Anxiety [] Confusion [] Agitation [] Behavior Problems  [] Hallucinations  [] Suicidal idiation  [x] Denies all unless marked  Sleep: [x]  Insomnia [] Sleep Disturbance [] Snoring [] Restless Legs [] Daytime Sleeping [] Sleep Apnea  [x] Denies all unless marked  Hematological:[] Adenopathy [] Bruises/Bleeds Easily  [x] Denies all unless marked  Endocrine: [] Cold Intolerance [] Heat Intolerance [] Polydipsia [] Polyphagia [] Polyuria  [x]Denies all unless marked  Allergic/Immunologic:[] Environmental Allergies [] Food Allergies [] Immunocompromised state  [x] Denies all unless marked    PHYSICAL EXAM  /64   Pulse 99   Ht 5' 2\" (1.575 m)   Wt 206 lb (93.4 kg)   SpO2 98%   BMI 37.68 kg/m²       Constitutional  No acute distress    HEENT- Conjunctiva normal.  No scars, masses, or lesions over external nose or ears  Musculoskeletal  No significant wasting of muscles noted, no bony deformities  Extremities - No clubbing, cyanosis or edema  Skin  Warm, dry, and intact.   No rash, erythema, or pallor  Psychiatric  Mood, affect, and behavior appear normal      NEUROLOGICAL EXAM    Mental status   [x]Awake, alert, oriented   [x]Affect attention and concentration appear appropriate  [x]Recent and remote memory appears

## 2018-04-25 ENCOUNTER — PROCEDURE VISIT (OUTPATIENT)
Dept: NEUROSURGERY | Age: 38
End: 2018-04-25
Payer: COMMERCIAL

## 2018-04-25 ENCOUNTER — TELEPHONE (OUTPATIENT)
Dept: NEUROSURGERY | Age: 38
End: 2018-04-25

## 2018-04-25 VITALS
HEART RATE: 92 BPM | WEIGHT: 229.2 LBS | SYSTOLIC BLOOD PRESSURE: 127 MMHG | OXYGEN SATURATION: 98 % | DIASTOLIC BLOOD PRESSURE: 75 MMHG | BODY MASS INDEX: 41.92 KG/M2

## 2018-04-25 PROCEDURE — 64615 CHEMODENERV MUSC MIGRAINE: CPT | Performed by: PSYCHIATRY & NEUROLOGY

## 2018-04-25 RX ORDER — ESOMEPRAZOLE MAGNESIUM 40 MG/1
40 CAPSULE, DELAYED RELEASE ORAL DAILY
COMMUNITY
Start: 2018-04-18 | End: 2018-09-14 | Stop reason: ALTCHOICE

## 2018-04-25 RX ORDER — ESCITALOPRAM OXALATE 20 MG/1
20 TABLET ORAL DAILY
COMMUNITY
Start: 2018-04-24 | End: 2019-12-09 | Stop reason: ALTCHOICE

## 2018-05-24 RX ORDER — GABAPENTIN 300 MG/1
300 CAPSULE ORAL 3 TIMES DAILY
Qty: 90 CAPSULE | Refills: 5 | Status: SHIPPED | OUTPATIENT
Start: 2018-05-24 | End: 2018-12-01 | Stop reason: SDUPTHER

## 2018-06-08 ENCOUNTER — OFFICE VISIT (OUTPATIENT)
Dept: NEUROSURGERY | Age: 38
End: 2018-06-08
Payer: COMMERCIAL

## 2018-06-08 VITALS
HEIGHT: 62 IN | WEIGHT: 235.2 LBS | DIASTOLIC BLOOD PRESSURE: 75 MMHG | SYSTOLIC BLOOD PRESSURE: 116 MMHG | BODY MASS INDEX: 43.28 KG/M2 | HEART RATE: 99 BPM

## 2018-06-08 PROCEDURE — 99213 OFFICE O/P EST LOW 20 MIN: CPT | Performed by: NURSE PRACTITIONER

## 2018-06-08 RX ORDER — FUROSEMIDE 40 MG/1
40 TABLET ORAL SEE ADMIN INSTRUCTIONS
COMMUNITY
Start: 2018-05-09 | End: 2018-09-14 | Stop reason: ALTCHOICE

## 2018-06-08 RX ORDER — OLANZAPINE 2.5 MG/1
1 TABLET ORAL 2 TIMES DAILY
Refills: 6 | COMMUNITY
Start: 2018-05-25

## 2018-06-08 RX ORDER — VERAPAMIL HYDROCHLORIDE 120 MG/1
1 CAPSULE, EXTENDED RELEASE ORAL DAILY
Refills: 3 | COMMUNITY
Start: 2018-05-31 | End: 2018-09-14 | Stop reason: ALTCHOICE

## 2018-07-17 ENCOUNTER — PROCEDURE VISIT (OUTPATIENT)
Dept: NEUROSURGERY | Age: 38
End: 2018-07-17
Payer: COMMERCIAL

## 2018-07-17 VITALS
WEIGHT: 231.2 LBS | HEART RATE: 88 BPM | BODY MASS INDEX: 42.55 KG/M2 | HEIGHT: 62 IN | DIASTOLIC BLOOD PRESSURE: 82 MMHG | OXYGEN SATURATION: 97 % | SYSTOLIC BLOOD PRESSURE: 138 MMHG

## 2018-07-17 PROCEDURE — 64615 CHEMODENERV MUSC MIGRAINE: CPT | Performed by: PSYCHIATRY & NEUROLOGY

## 2018-07-17 NOTE — PROGRESS NOTES
[facility] Neurology Botox Procedure Note     Patient:   Buddy Florian  MR#:    [id number]  Account Number:                         YOB: 1980  Date of Evaluation:  7/17/2018  Time of Note:                          11:18 AM  Primary Physician:    Breanna Delcid DO   Consulting Physician:  Brooklynn Stephenson D.O. Consent was signed and on the chart. Risk, benefits, and side effects discussed. Pt has a clear history of having more than 15 days/month of migraine, lasting more than 4 hours with multiple treatment failures. Botox [employer]  Lot [id number]  Exp     Botox was diluted with 0.9% NS to yield a final concentration of 50 units / 1 ml. The following muscles were injected in 0.1 ml (5 unit) increments:    -   5 units left, 5 units right  Procerus-      5 units  Frontalis-      20 units divided into 4 sites left and right  Temporalis-  40 units divided into 4 sites left and 4 sites right  Occipitalis-    30 units divided into 3 sites left and 3 sites right  Cervical Paraspinal-  20 units divided into 2 sites left and 2 sites right  Trapezius-     30 units divided into 3 sites left and 3 sites right    Total units injected: 155  Total units unavoidably discarded: 45    Pt tolerated the procedure well. There were no complications. Pt will follow up in 6 weeks to assess effectiveness and will repeat injections in 12 weeks if continues to benefit.         Brooklynn Stephenson DO  Board Certified Neurologist

## 2018-07-23 ENCOUNTER — HOSPITAL ENCOUNTER (EMERGENCY)
Facility: HOSPITAL | Age: 38
Discharge: HOME | End: 2018-07-23
Admitting: EMERGENCY MEDICINE

## 2018-07-23 VITALS
OXYGEN SATURATION: 100 % | HEIGHT: 62 IN | TEMPERATURE: 98.9 F | SYSTOLIC BLOOD PRESSURE: 164 MMHG | RESPIRATION RATE: 18 BRPM | WEIGHT: 234 LBS | DIASTOLIC BLOOD PRESSURE: 111 MMHG | HEART RATE: 88 BPM | BODY MASS INDEX: 43.06 KG/M2

## 2018-07-23 DIAGNOSIS — S31.41XA LACERATION OF VAGINA, INITIAL ENCOUNTER: Primary | ICD-10-CM

## 2018-07-23 PROCEDURE — 25010000002 MORPHINE PER 10 MG: Performed by: NURSE PRACTITIONER

## 2018-07-23 PROCEDURE — 96372 THER/PROPH/DIAG INJ SC/IM: CPT

## 2018-07-23 PROCEDURE — 99284 EMERGENCY DEPT VISIT MOD MDM: CPT

## 2018-07-23 RX ORDER — HYDROCODONE BITARTRATE AND ACETAMINOPHEN 7.5; 325 MG/1; MG/1
1 TABLET ORAL EVERY 6 HOURS PRN
Qty: 12 TABLET | Refills: 0 | Status: SHIPPED | OUTPATIENT
Start: 2018-07-23 | End: 2018-07-26

## 2018-07-23 RX ORDER — LIDOCAINE HYDROCHLORIDE 10 MG/ML
10 INJECTION, SOLUTION INFILTRATION; PERINEURAL ONCE
Status: DISCONTINUED | OUTPATIENT
Start: 2018-07-23 | End: 2018-07-24 | Stop reason: HOSPADM

## 2018-07-23 RX ORDER — MORPHINE SULFATE 4 MG/ML
4 INJECTION, SOLUTION INTRAMUSCULAR; INTRAVENOUS ONCE
Status: COMPLETED | OUTPATIENT
Start: 2018-07-23 | End: 2018-07-23

## 2018-07-23 RX ORDER — ONDANSETRON 4 MG/1
4 TABLET, ORALLY DISINTEGRATING ORAL ONCE
Status: COMPLETED | OUTPATIENT
Start: 2018-07-23 | End: 2018-07-23

## 2018-07-23 RX ORDER — CEPHALEXIN 500 MG/1
500 CAPSULE ORAL 3 TIMES DAILY
Qty: 21 CAPSULE | Refills: 0 | Status: SHIPPED | OUTPATIENT
Start: 2018-07-23 | End: 2018-07-30

## 2018-07-23 RX ADMIN — ONDANSETRON 4 MG: 4 TABLET, ORALLY DISINTEGRATING ORAL at 22:41

## 2018-07-23 RX ADMIN — MORPHINE SULFATE 4 MG: 4 INJECTION INTRAVENOUS at 22:41

## 2018-09-14 ENCOUNTER — OFFICE VISIT (OUTPATIENT)
Dept: NEUROSURGERY | Age: 38
End: 2018-09-14
Payer: COMMERCIAL

## 2018-09-14 VITALS
SYSTOLIC BLOOD PRESSURE: 139 MMHG | WEIGHT: 229.8 LBS | HEIGHT: 62 IN | DIASTOLIC BLOOD PRESSURE: 89 MMHG | HEART RATE: 113 BPM | BODY MASS INDEX: 42.29 KG/M2

## 2018-09-14 PROCEDURE — 99213 OFFICE O/P EST LOW 20 MIN: CPT | Performed by: NURSE PRACTITIONER

## 2018-09-14 NOTE — PROGRESS NOTES
REVIEW OF SYSTEMS    Constitutional: []Fever []Sweat []Chills [] Recent Injury [x] Denies all unless marked  HEENT:[x]Headache  [] Head Injury/Hearing Loss  [] Sore Throat  [] Ear Ache/Dizziness  [x] Denies all unless marked  Spine:  [] Neck pain  [] Back pain  [] Sciaticia  [x] Denies all unless marked  Cardiovascular:[]Heart Disease []Chest Pain [] Palpitations  [x] Denies all unless marked  Pulmonary: []Shortness of Breath []Cough   [x] Denies all unless marke  Gastrointestinal: []Nausea  []Vomiting  []Abdominal Pain  []Constipation  []Diarrhea  []Dark Bloody Stools  [x] Denies all unless marked  Psychiatric/Behavioral:[x] Depression [x] Anxiety [x] Denies all unless marked  Genitourinary:   [] Frequency  [] Urgency  [] Incontinence [] Pain with Urination  [x] Denies all unless marked  Extremities: []Pain  []Swelling  [x] Denies all unless marked  Musculoskeletal: [] Muscle Pain  [] Joint Pain  [] Arthritis [] Muscle Cramps [] Muscle Twitches  [x] Denies all unless marked  Sleep: [] Insomnia [] Snoring [] Restless Legs [] Sleep Apnea  [x] Daytime Sleepiness  [x] Denies all unless marked  Skin:[] Rash [] Skin Discoloration [x] Denies all unless marked   Neurological: []Visual Disturbance/Memory Loss [] Loss of Balance [] Slurred Speech/Weakness [] Seizures  [] Vertigo/Dizziness [x] Denies all unless marked

## 2018-10-09 ENCOUNTER — PROCEDURE VISIT (OUTPATIENT)
Dept: NEUROSURGERY | Age: 38
End: 2018-10-09
Payer: COMMERCIAL

## 2018-10-09 VITALS
HEIGHT: 62 IN | HEART RATE: 77 BPM | SYSTOLIC BLOOD PRESSURE: 137 MMHG | WEIGHT: 228 LBS | OXYGEN SATURATION: 96 % | DIASTOLIC BLOOD PRESSURE: 81 MMHG | BODY MASS INDEX: 41.96 KG/M2

## 2018-10-09 DIAGNOSIS — G43.719 INTRACTABLE CHRONIC MIGRAINE WITHOUT AURA AND WITHOUT STATUS MIGRAINOSUS: Primary | ICD-10-CM

## 2018-10-09 PROCEDURE — 64615 CHEMODENERV MUSC MIGRAINE: CPT | Performed by: PSYCHIATRY & NEUROLOGY

## 2018-10-09 RX ORDER — OXYCODONE AND ACETAMINOPHEN 7.5; 325 MG/1; MG/1
1 TABLET ORAL
Refills: 0 | COMMUNITY
Start: 2018-09-20 | End: 2018-11-20 | Stop reason: ALTCHOICE

## 2018-10-09 NOTE — PROGRESS NOTES
[facility] Neurology Botox Procedure Note     Patient:   Osbaldo Be  MR#:    [id number]  Account Number:                         YOB: 1980  Date of Evaluation:  10/9/2018  Time of Note:                          10:53 AM  Primary Physician:    Tonya Cowden, DO   Consulting Physician:  Klever Garrett D.O. Consent was signed and on the chart. Risk, benefits, and side effects discussed. Pt has a clear history of having more than 15 days/month of migraine, lasting more than 4 hours with multiple treatment failures. Botox [facility]  LOT #[id number]  EXP     Botox was diluted with 0.9% NS to yield a final concentration of 50 units / 1 ml. The following muscles were injected in 0.1 ml (5 unit) increments:    -   5 units left, 5 units right  Procerus-      5 units  Frontalis-      20 units divided into 4 sites left and right  Temporalis-  40 units divided into 4 sites left and 4 sites right  Occipitalis-    30 units divided into 3 sites left and 3 sites right  Cervical Paraspinal-  20 units divided into 2 sites left and 2 sites right  Trapezius-     30 units divided into 3 sites left and 3 sites right    Total units injected: 155  Total units unavoidably discarded: 45    Pt tolerated the procedure well. There were no complications. Pt will follow up in 6 weeks to assess effectiveness and will repeat injections in 12 weeks if continues to benefit.         Klever Garrett DO  Board Certified Neurologist

## 2018-10-16 DIAGNOSIS — R51.9 NONINTRACTABLE HEADACHE, UNSPECIFIED CHRONICITY PATTERN, UNSPECIFIED HEADACHE TYPE: Primary | ICD-10-CM

## 2018-10-18 RX ORDER — BUTALBITAL, ACETAMINOPHEN AND CAFFEINE 50; 325; 40 MG/1; MG/1; MG/1
1 CAPSULE ORAL 3 TIMES DAILY PRN
Qty: 40 CAPSULE | Refills: 3 | Status: SHIPPED | OUTPATIENT
Start: 2018-10-18 | End: 2019-07-08 | Stop reason: SDUPTHER

## 2018-11-20 ENCOUNTER — OFFICE VISIT (OUTPATIENT)
Dept: NEUROSURGERY | Age: 38
End: 2018-11-20
Payer: COMMERCIAL

## 2018-11-20 VITALS
HEART RATE: 87 BPM | OXYGEN SATURATION: 96 % | SYSTOLIC BLOOD PRESSURE: 116 MMHG | HEIGHT: 62 IN | DIASTOLIC BLOOD PRESSURE: 68 MMHG | BODY MASS INDEX: 42.73 KG/M2 | WEIGHT: 232.2 LBS

## 2018-11-20 DIAGNOSIS — R56.9 CONVULSIONS, UNSPECIFIED CONVULSION TYPE (HCC): ICD-10-CM

## 2018-11-20 PROCEDURE — 99214 OFFICE O/P EST MOD 30 MIN: CPT | Performed by: PSYCHIATRY & NEUROLOGY

## 2018-12-01 DIAGNOSIS — G60.9 IDIOPATHIC POLYNEUROPATHY: Primary | ICD-10-CM

## 2018-12-03 RX ORDER — GABAPENTIN 300 MG/1
CAPSULE ORAL
Qty: 90 CAPSULE | Refills: 5 | Status: SHIPPED | OUTPATIENT
Start: 2018-12-03 | End: 2019-06-05 | Stop reason: SDUPTHER

## 2019-01-02 ENCOUNTER — TELEPHONE (OUTPATIENT)
Dept: NEUROLOGY | Age: 39
End: 2019-01-02

## 2019-01-08 ENCOUNTER — PROCEDURE VISIT (OUTPATIENT)
Dept: NEUROSURGERY | Age: 39
End: 2019-01-08
Payer: COMMERCIAL

## 2019-01-08 VITALS
BODY MASS INDEX: 40.48 KG/M2 | DIASTOLIC BLOOD PRESSURE: 87 MMHG | HEART RATE: 101 BPM | SYSTOLIC BLOOD PRESSURE: 140 MMHG | WEIGHT: 220 LBS | HEIGHT: 62 IN

## 2019-01-08 DIAGNOSIS — G43.719 INTRACTABLE CHRONIC MIGRAINE WITHOUT AURA AND WITHOUT STATUS MIGRAINOSUS: Primary | ICD-10-CM

## 2019-01-08 PROCEDURE — 64615 CHEMODENERV MUSC MIGRAINE: CPT | Performed by: PSYCHIATRY & NEUROLOGY

## 2019-02-06 ENCOUNTER — PATIENT MESSAGE (OUTPATIENT)
Dept: NEUROSURGERY | Age: 39
End: 2019-02-06

## 2019-02-13 ENCOUNTER — OFFICE VISIT (OUTPATIENT)
Dept: NEUROSURGERY | Age: 39
End: 2019-02-13
Payer: COMMERCIAL

## 2019-02-13 VITALS
HEART RATE: 108 BPM | BODY MASS INDEX: 42.47 KG/M2 | OXYGEN SATURATION: 97 % | SYSTOLIC BLOOD PRESSURE: 122 MMHG | HEIGHT: 62 IN | WEIGHT: 230.8 LBS | DIASTOLIC BLOOD PRESSURE: 74 MMHG

## 2019-02-13 DIAGNOSIS — R56.9 CONVULSIONS, UNSPECIFIED CONVULSION TYPE (HCC): ICD-10-CM

## 2019-02-13 DIAGNOSIS — G60.9 IDIOPATHIC POLYNEUROPATHY: ICD-10-CM

## 2019-02-13 PROCEDURE — 99214 OFFICE O/P EST MOD 30 MIN: CPT | Performed by: PSYCHIATRY & NEUROLOGY

## 2019-02-13 RX ORDER — BROMPHENIRAMINE MALEATE, PSEUDOEPHEDRINE HYDROCHLORIDE, AND DEXTROMETHORPHAN HYDROBROMIDE 2; 30; 10 MG/5ML; MG/5ML; MG/5ML
10 SYRUP ORAL
Refills: 0 | COMMUNITY
Start: 2019-02-11 | End: 2019-12-09 | Stop reason: ALTCHOICE

## 2019-02-13 RX ORDER — AMOXICILLIN AND CLAVULANATE POTASSIUM 875; 125 MG/1; MG/1
1 TABLET, FILM COATED ORAL DAILY
Refills: 0 | COMMUNITY
Start: 2019-02-08 | End: 2019-12-09 | Stop reason: ALTCHOICE

## 2019-02-13 RX ORDER — METHYLPREDNISOLONE 4 MG/1
1 TABLET ORAL DAILY
Refills: 0 | COMMUNITY
Start: 2019-02-08 | End: 2019-12-09 | Stop reason: ALTCHOICE

## 2019-03-28 ENCOUNTER — HOSPITAL ENCOUNTER (OUTPATIENT)
Dept: PAIN MANAGEMENT | Age: 39
Discharge: HOME | End: 2019-03-28
Payer: COMMERCIAL

## 2019-03-28 VITALS
HEART RATE: 74 BPM | TEMPERATURE: 96.6 F | OXYGEN SATURATION: 95 % | SYSTOLIC BLOOD PRESSURE: 162 MMHG | DIASTOLIC BLOOD PRESSURE: 108 MMHG | RESPIRATION RATE: 20 BRPM

## 2019-03-28 PROCEDURE — 64615 CHEMODENERV MUSC MIGRAINE: CPT | Performed by: PSYCHIATRY & NEUROLOGY

## 2019-03-28 PROCEDURE — 2580000003 HC RX 258

## 2019-03-28 PROCEDURE — 64612 DESTROY NERVE FACE MUSCLE: CPT

## 2019-03-28 PROCEDURE — 64616 CHEMODENERV MUSC NECK DYSTON: CPT

## 2019-03-28 PROCEDURE — 64405 NJX AA&/STRD GR OCPL NRV: CPT

## 2019-03-28 PROCEDURE — 6360000002 HC RX W HCPCS

## 2019-03-28 ASSESSMENT — PAIN - FUNCTIONAL ASSESSMENT: PAIN_FUNCTIONAL_ASSESSMENT: 0-10

## 2019-05-01 ENCOUNTER — OFFICE VISIT (OUTPATIENT)
Dept: NEUROSURGERY | Age: 39
End: 2019-05-01
Payer: COMMERCIAL

## 2019-05-01 VITALS
HEART RATE: 80 BPM | WEIGHT: 235 LBS | RESPIRATION RATE: 18 BRPM | SYSTOLIC BLOOD PRESSURE: 138 MMHG | DIASTOLIC BLOOD PRESSURE: 86 MMHG | TEMPERATURE: 98.2 F | BODY MASS INDEX: 43.24 KG/M2 | HEIGHT: 62 IN

## 2019-05-01 DIAGNOSIS — G60.9 IDIOPATHIC POLYNEUROPATHY: ICD-10-CM

## 2019-05-01 DIAGNOSIS — R56.9 CONVULSIONS, UNSPECIFIED CONVULSION TYPE (HCC): ICD-10-CM

## 2019-05-01 PROCEDURE — 99214 OFFICE O/P EST MOD 30 MIN: CPT | Performed by: PSYCHIATRY & NEUROLOGY

## 2019-05-01 NOTE — PROGRESS NOTES
[facility] Neurology Office Note      Patient:   Carlita Gao  MR#:    [id number]  Account Number:                         YOB: 1980  Date of Evaluation:  2019  Time of Note:                          8:45 AM  Primary/Referring Physician:  Jeannine Bunch DO   Consulting Physician:  Yoni Roberson DO    FOLLOW UP VISIT    Chief Complaint   Patient presents with    Follow-up     6 wk f/u chronic migraines. HISTORY OF PRESENT ILLNESS    Here for follow up. Doing much better, botox is helping. Headaches remain much improved overall, s/p botox, doing well. Severity much improved,no worsening since last seen. Patient noting worsening headaches over the last few years. Hospitalized at [facility] previously with right sided weakness associated with headache and diagnosed with hemiplegic migraine. Headache frequency is much improved since last seen. Still noting frontal pain, last hours to days, photophobia, nausea, throbbing pain. Notes scotoma like visual changes with the headaches \"spots\" in her vision with her headaches. Tried topamax, not effective. Off verapamil. Not overly helpful. Taking esgic prn. Off Lamictal.  Not currently on a triptan. Last MRI was at [facility]. CT head at [facility], negative. Patient was having 15 headache days a month lasting 4 hours previously prior to botox. On neurontin now as well, which may be helping. No other complaints today. Doing better on botox.      Past Medical History:   Diagnosis Date    Anxiety     Depression     Hypertension     Migraine        Past Surgical History:   Procedure Laterality Date     SECTION      HYSTERECTOMY         Family History   Problem Relation Age of Onset    Ataxia Mother     High Blood Pressure Mother     High Blood Pressure Father     COPD Father        Social History     Socioeconomic History    Marital status:      Spouse name: Not on file    Number of children: Not on file    Years of education: Not on file    Highest education level: Not on file   Occupational History    Not on file   Social Needs    Financial resource strain: Not on file    Food insecurity:     Worry: Not on file     Inability: Not on file    Transportation needs:     Medical: Not on file     Non-medical: Not on file   Tobacco Use    Smoking status: Never Smoker    Smokeless tobacco: Never Used   Substance and Sexual Activity    Alcohol use: No    Drug use: No    Sexual activity: Yes     Partners: Male   Lifestyle    Physical activity:     Days per week: Not on file     Minutes per session: Not on file    Stress: Not on file   Relationships    Social connections:     Talks on phone: Not on file     Gets together: Not on file     Attends [employer] service: Not on file     Active member of club or organization: Not on file     Attends meetings of clubs or organizations: Not on file     Relationship status: Not on file    Intimate partner violence:     Fear of current or ex partner: Not on file     Emotionally abused: Not on file     Physically abused: Not on file     Forced sexual activity: Not on file   Other Topics Concern    Not on file   Social History Narrative    Not on file       Current Outpatient Medications   Medication Sig Dispense Refill    gabapentin (NEURONTIN) 300 MG capsule TAKE 1 CAPSULE BY MOUTH THREE TIMES A DAY 90 capsule 5    butalbital-apap-caffeine -40 MG CAPS Take 1 capsule by mouth 3 times daily as needed for Headaches 40 capsule 3    OLANZapine (ZYPREXA) 2.5 MG tablet Take 1 tablet by mouth 2 times daily  6    escitalopram (LEXAPRO) 20 MG tablet Take 20 mg by mouth daily      amoxicillin-clavulanate (AUGMENTIN) 875-125 MG per tablet Take 1 tablet by mouth daily 10 days  0    methylPREDNISolone (MEDROL DOSEPACK) 4 MG tablet Take 1 tablet by mouth daily As directed 6 days  0    brompheniramine-pseudoephedrine-DM 2-30-10 MG/5ML syrup Take 10 mLs by mouth every 6-8 hours as Respiratory-   Good expansion, normal effort without use of accessory muscles  CTA  Musculoskeletal -   No significant wasting of muscles noted  Gait as below, see gait exam in the neurologic exam  Muscle strength, tone, stability as below. No bony deformities  Skin -   Warm, dry, and intact to inspection and palpation. No rash, erythema, or pallor  Psychiatric -   Mood, affect, and behavior appear normal    Memory as below see mental status examination in the neurologic exam      NEUROLOGICAL EXAM    Mental status   [x]Awake, alert, oriented   [x]Affect attention and concentration appear appropriate  [x]Recent and remote memory appears unremarkable  [x]Speech normal without dysarthria or aphasia, comprehension and repetition intact. COMMENTS:    Cranial Nerves [x]No VF deficit to confrontation,  no papilledema on fundoscopic exam.  [x]PERRLA, EOMI, no nystagmus, conjugate eye movements, no ptosis  [x]Face symmetric  [x]Facial sensation intact  [x]Tongue midline no atrophy or fasciculations present  [x]Palate midline, hearing to finger rub normal bilaterally  [x]Shoulder shrug and SCM testing normal bilaterally  COMMENTS:   Motor   [x]5/5 strength x 4 extremities  [x]Normal bulk and tone  [x]No tremor present  [x]No rigidity or bradykinesia noted  COMMENTS:   Sensory  [x]Sensation intact to light touch, pin prick, vibration, and proprioception BLE  []Sensation intact to light touch, pin prick, vibration, and proprioception BUE  COMMENTS:   Coordination [x]FTN normal bilaterally   [x]HTS normal bilaterally  [x]MELLISA normal bilaterally. COMMENTS:   Reflexes  [x]Symmetric and non-pathological  [x]Toes down going bilaterally  [x]No clonus present  COMMENTS:   Gait                  [x]Normal steady gait    []Ataxic    []Spastic     []Magnetic     []Shuffling  COMMENTS:       LABS RECORD AND IMAGING REVIEW (As below and per HPI)    Records reviewed. Prior MRI/MRA/EEG/CD normal.     EEG was normal as well.

## 2019-06-05 DIAGNOSIS — G60.9 IDIOPATHIC POLYNEUROPATHY: ICD-10-CM

## 2019-06-06 RX ORDER — GABAPENTIN 300 MG/1
CAPSULE ORAL
Qty: 90 CAPSULE | Refills: 5 | Status: SHIPPED | OUTPATIENT
Start: 2019-06-06 | End: 2019-11-14

## 2019-06-20 ENCOUNTER — HOSPITAL ENCOUNTER (OUTPATIENT)
Dept: PAIN MANAGEMENT | Age: 39
Discharge: HOME | End: 2019-06-20
Payer: COMMERCIAL

## 2019-06-20 VITALS
OXYGEN SATURATION: 98 % | SYSTOLIC BLOOD PRESSURE: 129 MMHG | DIASTOLIC BLOOD PRESSURE: 83 MMHG | RESPIRATION RATE: 18 BRPM | TEMPERATURE: 97 F | HEART RATE: 81 BPM

## 2019-06-20 PROCEDURE — 6360000002 HC RX W HCPCS

## 2019-06-20 PROCEDURE — 64615 CHEMODENERV MUSC MIGRAINE: CPT | Performed by: PSYCHIATRY & NEUROLOGY

## 2019-06-20 PROCEDURE — 2580000003 HC RX 258

## 2019-06-20 PROCEDURE — 64615 CHEMODENERV MUSC MIGRAINE: CPT

## 2019-06-20 NOTE — PROGRESS NOTES
[facility] Neurology Botox Procedure Note     Patient:   Blayne Ridley  MR#:    [id number]  Account Number:                   [id number]      YOB: 1980  Date of Evaluation:  6/20/2019  Time of Note:                          1:06 PM  Primary Physician:    Karli Garcia DO   Consulting Physician:  Azar Denton DO    Consent was signed and on the chart. Risk, benefits, and side effects discussed. Pt has a clear history of having more than 15 days/month of migraine, lasting more than 4 hours with multiple treatment failures. Vial Exp Date: 11/21  [employer] Lot Number:  [id number]    Botox was diluted with 0.9% NS to yield a final concentration of 50 units / 1 ml. The following muscles were injected in 0.1 ml (5 unit) increments:    -   5 units left, 5 units right  Procerus-      5 units  Frontalis-      20 units divided into 4 sites left and right  Temporalis-  40 units divided into 4 sites left and 4 sites right  Occipitalis-    30 units divided into 3 sites left and 3 sites right  Cervical Paraspinal-  20 units divided into 2 sites left and 2 sites right  Trapezius-     30 units divided into 3 sites left and 3 sites right    Total units injected: 155  Total units unavoidably discarded: 45    Pt tolerated the procedure well. There were no complications. Pt will follow up in 6 weeks to assess effectiveness and will repeat injections in 12 weeks if continues to benefit.         Azar Denton DO  Board Certified Neurologist

## 2019-07-08 DIAGNOSIS — R51.9 NONINTRACTABLE HEADACHE, UNSPECIFIED CHRONICITY PATTERN, UNSPECIFIED HEADACHE TYPE: ICD-10-CM

## 2019-07-08 RX ORDER — BUTALBITAL, ACETAMINOPHEN AND CAFFEINE 50; 325; 40 MG/1; MG/1; MG/1
1 CAPSULE ORAL 3 TIMES DAILY PRN
Qty: 40 CAPSULE | Refills: 5 | Status: SHIPPED | OUTPATIENT
Start: 2019-07-08 | End: 2020-03-02 | Stop reason: SDUPTHER

## 2019-08-27 ENCOUNTER — TELEPHONE (OUTPATIENT)
Dept: NEUROLOGY | Age: 39
End: 2019-08-27

## 2019-08-27 NOTE — TELEPHONE ENCOUNTER
called pt RE: appt scheduled with Sanjiv Loges for tomorrow, [email or web address]; spoke to patient;appt confirmed;dutch

## 2019-08-28 ENCOUNTER — OFFICE VISIT (OUTPATIENT)
Dept: NEUROSURGERY | Age: 39
End: 2019-08-28
Payer: COMMERCIAL

## 2019-08-28 VITALS
DIASTOLIC BLOOD PRESSURE: 86 MMHG | WEIGHT: 232 LBS | HEART RATE: 111 BPM | HEIGHT: 62 IN | SYSTOLIC BLOOD PRESSURE: 128 MMHG | OXYGEN SATURATION: 96 % | BODY MASS INDEX: 42.69 KG/M2

## 2019-08-28 PROCEDURE — 99213 OFFICE O/P EST LOW 20 MIN: CPT | Performed by: NURSE PRACTITIONER

## 2019-08-28 RX ORDER — BUSPIRONE HYDROCHLORIDE 10 MG/1
10 TABLET ORAL DAILY
Refills: 3 | COMMUNITY
Start: 2019-07-03 | End: 2019-12-09 | Stop reason: ALTCHOICE

## 2019-08-28 NOTE — PROGRESS NOTES
REVIEW OF SYSTEMS    Constitutional: []Fever []Sweat []Chills [] Recent Injury [x] Denies all unless marked  HEENT:[x]Headache  [] Head Injury/Hearing Loss  [] Sore Throat  [] Ear Ache/Dizziness  [] Denies all unless marked  Spine:  [] Neck pain  [] Back pain  [] Sciaticia  [x] Denies all unless marked  Cardiovascular:[]Heart Disease []Chest Pain [] Palpitations  [x] Denies all unless marked  Pulmonary: []Shortness of Breath []Cough   [x] Denies all unless marke  Gastrointestinal: []Nausea  []Vomiting  []Abdominal Pain  []Constipation  []Diarrhea  []Dark Bloody Stools  [x] Denies all unless marked  Psychiatric/Behavioral:[] Depression [] Anxiety [x] Denies all unless marked  Genitourinary:   [] Frequency  [] Urgency  [] Incontinence [] Pain with Urination  [x] Denies all unless marked  Extremities: []Pain  []Swelling  [x] Denies all unless marked  Musculoskeletal: [] Muscle Pain  [] Joint Pain  [] Arthritis [] Muscle Cramps [] Muscle Twitches  [x] Denies all unless marked  Sleep: [] Insomnia [] Snoring [] Restless Legs [] Sleep Apnea  [] Daytime Sleepiness  [x] Denies all unless marked  Skin:[] Rash [] Skin Discoloration [x] Denies all unless marked   Neurological: []Visual Disturbance/Memory Loss [] Loss of Balance [] Slurred Speech/Weakness [] Seizures  [] Vertigo/Dizziness [x] Denies all unless marked
accessory muscles  CTA  Musculoskeletal -   No significant wasting of muscles noted  Gait as below, see gait exam in the neurologic exam  Muscle strength, tone, stability as below. No bony deformities  Skin -   Warm, dry, and intact to inspection and palpation. No rash, erythema, or pallor  Psychiatric -   Mood, affect, and behavior appear normal    Memory as below see mental status examination in the neurologic exam      NEUROLOGICAL EXAM    Mental status   [x]Awake, alert, oriented   [x]Affect attention and concentration appear appropriate  [x]Recent and remote memory appears unremarkable  [x]Speech normal without dysarthria or aphasia, comprehension and repetition intact. COMMENTS:    Cranial Nerves [x]No VF deficit to confrontation,  no papilledema on fundoscopic exam.  [x]PERRLA, EOMI, no nystagmus, conjugate eye movements, no ptosis  [x]Face symmetric  [x]Facial sensation intact  [x]Tongue midline no atrophy or fasciculations present  [x]Palate midline, hearing to finger rub normal bilaterally  [x]Shoulder shrug and SCM testing normal bilaterally  COMMENTS:   Motor   [x]5/5 strength x 4 extremities  [x]Normal bulk and tone  [x]No tremor present  [x]No rigidity or bradykinesia noted  COMMENTS:   Sensory  [x]Sensation intact to light touch, pin prick, vibration, and proprioception BLE  []Sensation intact to light touch, pin prick, vibration, and proprioception BUE  COMMENTS:   Coordination [x]FTN normal bilaterally   [x]HTS normal bilaterally  [x]MELLISA normal bilaterally. COMMENTS:   Reflexes  [x]Symmetric and non-pathological  [x]Toes down going bilaterally  [x]No clonus present  COMMENTS:   Gait                  [x]Normal steady gait    []Ataxic    []Spastic     []Magnetic     []Shuffling  COMMENTS:       LABS RECORD AND IMAGING REVIEW (As below and per HPI)    Records reviewed. Prior MRI/MRA/EEG/CD normal.     EEG was normal as well.      ASSESSMENT:    Nolvia Samuel is a 46 y.o. female here for

## 2019-09-09 ENCOUNTER — HOSPITAL ENCOUNTER (OUTPATIENT)
Dept: PAIN MANAGEMENT | Age: 39
Discharge: HOME | End: 2019-09-09
Payer: COMMERCIAL

## 2019-09-09 VITALS
DIASTOLIC BLOOD PRESSURE: 70 MMHG | SYSTOLIC BLOOD PRESSURE: 144 MMHG | RESPIRATION RATE: 18 BRPM | TEMPERATURE: 99.1 F | HEART RATE: 99 BPM | OXYGEN SATURATION: 96 %

## 2019-09-09 PROCEDURE — 64615 CHEMODENERV MUSC MIGRAINE: CPT | Performed by: PSYCHIATRY & NEUROLOGY

## 2019-09-09 PROCEDURE — 6360000002 HC RX W HCPCS

## 2019-09-09 PROCEDURE — 64615 CHEMODENERV MUSC MIGRAINE: CPT

## 2019-09-09 RX ORDER — SODIUM CHLORIDE 0.9 % (FLUSH) 0.9 %
10 SYRINGE (ML) INJECTION ONCE
Status: DISCONTINUED | OUTPATIENT
Start: 2019-09-09 | End: 2019-09-11 | Stop reason: HOSPADM

## 2019-09-09 NOTE — PROGRESS NOTES
[id number] [facility] Neurology Botox Procedure Note     Patient:   Parker Frias  MR#:    [id number]  Account Number:                   [id number]      YOB: 1980  Date of Evaluation:  9/9/2019  Time of Note:                          4:53 PM  Primary Physician:    Ignacio Gavin DO   Consulting Physician:  Akash Marion DO    Consent was signed and on the chart. Risk, benefits, and side effects discussed. Pt has a clear history of having more than 15 days/month of migraine, lasting more than 4 hours with multiple treatment failures. Vial Exp Date: 3/22  Radha Zuluaga Lot Number:  [id number]    Botox was diluted with 0.9% NS to yield a final concentration of 50 units / 1 ml. The following muscles were injected in 0.1 ml (5 unit) increments:    -   5 units left, 5 units right  Procerus-      5 units  Frontalis-      20 units divided into 4 sites left and right  Temporalis-  40 units divided into 4 sites left and 4 sites right  Occipitalis-    30 units divided into 3 sites left and 3 sites right  Cervical Paraspinal-  20 units divided into 2 sites left and 2 sites right  Trapezius-     30 units divided into 3 sites left and 3 sites right    Total units injected: 155  Total units unavoidably discarded: 45    Pt tolerated the procedure well. There were no complications. Pt will follow up in 6 weeks to assess effectiveness and will repeat injections in 12 weeks if continues to benefit.         Akash Marion DO  Board Certified Neurologist

## 2019-11-14 ENCOUNTER — OFFICE VISIT (OUTPATIENT)
Dept: NEUROSURGERY | Age: 39
End: 2019-11-14
Payer: COMMERCIAL

## 2019-11-14 ENCOUNTER — TELEPHONE (OUTPATIENT)
Dept: NEUROSURGERY | Age: 39
End: 2019-11-14

## 2019-11-14 VITALS
DIASTOLIC BLOOD PRESSURE: 90 MMHG | BODY MASS INDEX: 41.55 KG/M2 | WEIGHT: 225.8 LBS | OXYGEN SATURATION: 98 % | HEART RATE: 96 BPM | HEIGHT: 62 IN | SYSTOLIC BLOOD PRESSURE: 132 MMHG

## 2019-11-14 PROCEDURE — 99213 OFFICE O/P EST LOW 20 MIN: CPT | Performed by: NURSE PRACTITIONER

## 2019-11-14 RX ORDER — GABAPENTIN 100 MG/1
100 CAPSULE ORAL 3 TIMES DAILY
Qty: 270 CAPSULE | Refills: 0 | Status: SHIPPED | OUTPATIENT
Start: 2019-11-14 | End: 2020-01-20

## 2019-11-14 RX ORDER — METOPROLOL SUCCINATE 50 MG/1
50 TABLET, EXTENDED RELEASE ORAL DAILY
Refills: 6 | COMMUNITY
Start: 2019-11-01

## 2019-11-27 ENCOUNTER — CLINICAL DOCUMENTATION (OUTPATIENT)
Dept: PAIN MANAGEMENT | Age: 39
End: 2019-11-27

## 2019-12-09 ENCOUNTER — HOSPITAL ENCOUNTER (OUTPATIENT)
Dept: PAIN MANAGEMENT | Age: 39
Discharge: HOME | End: 2019-12-09
Payer: COMMERCIAL

## 2019-12-09 VITALS
OXYGEN SATURATION: 98 % | SYSTOLIC BLOOD PRESSURE: 142 MMHG | DIASTOLIC BLOOD PRESSURE: 72 MMHG | HEART RATE: 77 BPM | RESPIRATION RATE: 20 BRPM | TEMPERATURE: 97.4 F

## 2019-12-09 PROCEDURE — 64615 CHEMODENERV MUSC MIGRAINE: CPT

## 2019-12-09 PROCEDURE — 6360000002 HC RX W HCPCS

## 2019-12-09 PROCEDURE — 64615 CHEMODENERV MUSC MIGRAINE: CPT | Performed by: PSYCHIATRY & NEUROLOGY

## 2020-01-20 ENCOUNTER — OFFICE VISIT (OUTPATIENT)
Dept: NEUROSURGERY | Age: 40
End: 2020-01-20
Payer: COMMERCIAL

## 2020-01-20 VITALS
HEIGHT: 62 IN | BODY MASS INDEX: 40.01 KG/M2 | SYSTOLIC BLOOD PRESSURE: 125 MMHG | DIASTOLIC BLOOD PRESSURE: 82 MMHG | WEIGHT: 217.4 LBS | OXYGEN SATURATION: 98 % | HEART RATE: 88 BPM

## 2020-01-20 LAB
AMPHETAMINE SCREEN, URINE: NORMAL
BARBITURATE SCREEN, URINE: NORMAL
BENZODIAZEPINE SCREEN, URINE: NORMAL
BUPRENORPHINE URINE: NORMAL
COCAINE METABOLITE SCREEN URINE: NORMAL
GABAPENTIN SCREEN, URINE: NORMAL
MDMA URINE: NORMAL
METHADONE SCREEN, URINE: NORMAL
METHAMPHETAMINE, URINE: NORMAL
OPIATE SCREEN URINE: NORMAL
OXYCODONE SCREEN URINE: NORMAL
PHENCYCLIDINE SCREEN URINE: NORMAL
PROPOXYPHENE SCREEN, URINE: NORMAL
THC SCREEN, URINE: NORMAL
TRICYCLIC ANTIDEPRESSANTS, UR: NORMAL

## 2020-01-20 PROCEDURE — 99213 OFFICE O/P EST LOW 20 MIN: CPT | Performed by: NURSE PRACTITIONER

## 2020-01-20 PROCEDURE — 80305 DRUG TEST PRSMV DIR OPT OBS: CPT | Performed by: NURSE PRACTITIONER

## 2020-01-20 NOTE — PROGRESS NOTES
[facility] Neurology Office Note      Patient:   Nav Lizarraga  MR#:    [id number]  Account Number:                         YOB: 1980  Date of Evaluation:  2020  Time of Note:                          9:45 AM  Primary/Referring Physician:  Mariel Schwarz DO   Consulting Physician:  ARIA Soto     FOLLOW UP VISIT    Chief Complaint   Patient presents with    Follow-up     Patient states her headaches have gotten better, she states she has not had one in a while.  Migraine       HISTORY OF PRESENT ILLNESS  Brandi Osborne is here today for Botox follow up. No side effects noted. Botox has been quite beneficial for headaches. Hasn't had any headaches in the past month. Off Gabapentin now. Has Fioricet prn but hasn't needed this recently. Prior headaches were frontal with photophobia, nausea, scotoma like visual changes with \"spots\" in her vision with headaches. She has had prior hospitalizations for right sided weakness/headaches, felt to be hemiplegic migraine. Has tried and failed  Topamax, Verapamil, Lamictal. Prior to Botox she noted greater than 15 headache days per month lasting longer than 4 hours. No other complaints today, much improved.      Past Medical History:   Diagnosis Date    Anxiety     Depression     Hemiplegic migraine     Hypertension     Migraine        Past Surgical History:   Procedure Laterality Date     SECTION      HYSTERECTOMY         Family History   Problem Relation Age of Onset    Ataxia Mother     High Blood Pressure Mother     High Blood Pressure Father     COPD Father        Social History     Socioeconomic History    Marital status:      Spouse name: Not on file    Number of children: Not on file    Years of education: Not on file    Highest education level: Not on file   Occupational History    Not on file   Social Needs    Financial resource strain: Not on file    Food insecurity:     Worry: Not on file Inability: Not on file    Transportation needs:     Medical: Not on file     Non-medical: Not on file   Tobacco Use    Smoking status: Never Smoker    Smokeless tobacco: Never Used   Substance and Sexual Activity    Alcohol use: No    Drug use: No    Sexual activity: Yes     Partners: Male   Lifestyle    Physical activity:     Days per week: Not on file     Minutes per session: Not on file    Stress: Not on file   Relationships    Social connections:     Talks on phone: Not on file     Gets together: Not on file     Attends [employer]ism service: Not on file     Active member of club or organization: Not on file     Attends meetings of clubs or organizations: Not on file     Relationship status: Not on file    Intimate partner violence:     Fear of current or ex partner: Not on file     Emotionally abused: Not on file     Physically abused: Not on file     Forced sexual activity: Not on file   Other Topics Concern    Not on file   Social History Narrative    Not on file       Current Outpatient Medications   Medication Sig Dispense Refill    metoprolol succinate (TOPROL XL) 25 MG extended release tablet Take 25 mg by mouth daily  6    butalbital-apap-caffeine -40 MG CAPS Take 1 capsule by mouth 3 times daily as needed for Headaches 40 capsule 5    OLANZapine (ZYPREXA) 2.5 MG tablet Take 1 tablet by mouth 2 times daily  6     No current facility-administered medications for this visit. ALLERGIES  No Known Allergies    REVIEW OF SYSTEMS  Constitutional: []? Fever []? Sweat []? Chills []? Recent Injury [x]? Denies all unless marked  HEENT:[x]? Headache  []? Head Injury/Hearing Loss  []? Sore Throat  []? Ear Ache/Dizziness  []? Denies all unless marked  Spine:  []? Neck pain  []? Back pain  []? Sciaticia  [x]? Denies all unless marked  Cardiovascular:[]? Heart Disease []? Chest Pain []? Palpitations  [x]? Denies all unless marked  Pulmonary: []? Shortness of Breath []? Cough   [x]?  Denies all unless marke  Gastrointestinal: []? Nausea  []? Vomiting  []? Abdominal Pain  []? Constipation  []? Diarrhea  []? Dark Bloody Stools  [x]? Denies all unless marked  Psychiatric/Behavioral:[]? Depression []? Anxiety [x]? Denies all unless marked  Genitourinary:   []? Frequency  []? Urgency  []? Incontinence []? Pain with Urination  [x]? Denies all unless marked  Extremities: []? Pain  []? Swelling  [x]? Denies all unless marked  Musculoskeletal: []? Muscle Pain  []? Joint Pain  []? Arthritis []? Muscle Cramps []? Muscle Twitches  [x]? Denies all unless marked  Sleep: []? Insomnia []? Snoring []? Restless Legs []? Sleep Apnea  []? Daytime Sleepiness  [x]? Denies all unless marked  Skin:[]? Rash []? Skin Discoloration [x]? Denies all unless marked   Neurological: []? Visual Disturbance/Memory Loss []? Loss of Balance []? Slurred Speech/Weakness []? Seizures  []? Vertigo/Dizziness [x]? Denies all unless marked    The MA has completed the ROS with the patient. I have reviewed it in its' entirety with the patient and agree with the documentation. PHYSICAL EXAM  Constitutional -   /82   Pulse 88   Ht 5' 2\" (1.575 m)   Wt 217 lb 6.4 oz (98.6 kg)   SpO2 98%   BMI 39.76 kg/m²   General appearance: No acute distress   EYES -   Conjunctiva normal  Pupillary exam as below, see CN exam in the neurologic exam  ENT-    No scars, masses, or lesions over external nose or ears  Hearing normal bilaterally to finger rub  Cardiovascular -   RRR  No clubbing, cyanosis, or edema   Respiratory-   Good expansion, normal effort without use of accessory muscles  CTA  Musculoskeletal -   No significant wasting of muscles noted  Gait as below, see gait exam in the neurologic exam  Muscle strength, tone, stability as below. No bony deformities  Skin -   Warm, dry, and intact to inspection and palpation.     No rash, erythema, or pallor  Psychiatric -   Mood, affect, and behavior appear normal    Memory as below see mental status examination in

## 2020-03-02 ENCOUNTER — HOSPITAL ENCOUNTER (OUTPATIENT)
Dept: PAIN MANAGEMENT | Age: 40
Discharge: HOME | End: 2020-03-02
Payer: COMMERCIAL

## 2020-03-02 VITALS
SYSTOLIC BLOOD PRESSURE: 148 MMHG | TEMPERATURE: 97.3 F | DIASTOLIC BLOOD PRESSURE: 79 MMHG | RESPIRATION RATE: 20 BRPM | OXYGEN SATURATION: 93 % | HEART RATE: 114 BPM

## 2020-03-02 PROCEDURE — 64615 CHEMODENERV MUSC MIGRAINE: CPT | Performed by: PSYCHIATRY & NEUROLOGY

## 2020-03-02 PROCEDURE — 64615 CHEMODENERV MUSC MIGRAINE: CPT

## 2020-03-02 PROCEDURE — 6360000002 HC RX W HCPCS

## 2020-03-02 NOTE — PROGRESS NOTES
[facility] Neurology Botox Procedure Note     Patient:   Ever Barajas  MR#:    [id number]  Account Number:                   [id number]      YOB: 1980  Date of Evaluation:  3/2/2020  Time of Note:                          1:38 PM  Primary Physician:    Leesa Reilly DO   Consulting Physician:  Cayla Escoto DO    Consent was signed and on the chart. Risk, benefits, and side effects discussed. Pt has a clear history of having more than 15 days/month of migraine, lasting more than 4 hours with multiple treatment failures. Vial Exp Date: 8/22  Migel Dewitt Lot Number:  [id number]    Botox was diluted with 0.9% NS to yield a final concentration of 50 units / 1 ml. The following muscles were injected in 0.1 ml (5 unit) increments:    -   5 units left, 5 units right  Procerus-      5 units  Frontalis-      20 units divided into 4 sites left and right  Temporalis-  40 units divided into 4 sites left and 4 sites right  Occipitalis-    30 units divided into 3 sites left and 3 sites right  Cervical Paraspinal-  20 units divided into 2 sites left and 2 sites right  Trapezius-     30 units divided into 3 sites left and 3 sites right    Total units injected: 155  Total units unavoidably discarded: 45    Pt tolerated the procedure well. There were no complications. Pt will follow up in 6 weeks to assess effectiveness and will repeat injections in 12 weeks if continues to benefit.         Cayla Escoto DO  Board Certified Neurologist

## 2020-06-01 ENCOUNTER — HOSPITAL ENCOUNTER (OUTPATIENT)
Dept: PAIN MANAGEMENT | Age: 40
Discharge: HOME | End: 2020-06-01
Payer: COMMERCIAL

## 2020-06-01 VITALS
RESPIRATION RATE: 18 BRPM | OXYGEN SATURATION: 99 % | BODY MASS INDEX: 38.64 KG/M2 | TEMPERATURE: 97.4 F | DIASTOLIC BLOOD PRESSURE: 91 MMHG | SYSTOLIC BLOOD PRESSURE: 150 MMHG | HEART RATE: 72 BPM | WEIGHT: 210 LBS | HEIGHT: 62 IN

## 2020-06-01 PROCEDURE — 64615 CHEMODENERV MUSC MIGRAINE: CPT | Performed by: PSYCHIATRY & NEUROLOGY

## 2020-06-01 PROCEDURE — 64615 CHEMODENERV MUSC MIGRAINE: CPT

## 2020-06-01 PROCEDURE — 6360000002 HC RX W HCPCS

## 2020-06-01 NOTE — PROGRESS NOTES
Procedure:  Level of Consciousness: [x]Alert []Oriented []Disoriented []Lethargic  Anxiety Level: [x]Calm []Anxious []Depressed []Other  Skin: [x]Warm [x]Dry []Cool []Moist []Intact []Other  Cardiovascular: []Palpitations: [x]Never []Occasionally []Frequently  Chest Pain: [x]No []Yes  Respiratory:  [x]Unlabored []Labored []Cough ([] Productive []Unproductive)  HCG Required: [x]No []Yes   Results: []Negative []Positive  Knowledge Level:        []Patient/Other verbalized understanding of pre-procedure instructions. []Assessment of post-op care needs (transportation, responsible caregiver)        []Able to discuss health care problems and how to deal with it.   Factors that Affect Teaching:        Language Barrier: [x]No []Yes - why:        Hearing Loss:        [x]No []Yes            Corrective Device:  []Yes []No        Vision Loss:           [x]No []Yes            Corrective Device:  []Yes []No        Memory Loss:       [x]No []Yes            []Short Term []Long Term  Motivational Level:  []Asks Questions                  []Extremely Anxious       []Seems Interested               []Seems Uninterested                  []Denies need for Education  Risk for Injury:  []Patient oriented to person, place and time  []History of frequent falls/loss of balance  Nutritional:  []Change in appetite   []Weight Gain   []Weight Loss  Functional:  []Requires assistance with ADL's

## 2020-08-06 NOTE — TELEPHONE ENCOUNTER
Requested Medications   VERAPAMIL 40MG TABLET 40 TAB  Will file in chart as: verapamil (CALAN) 40 MG tablet  TAKE TWO TABLETS BY MOUTH TWO TIMES A DAY       Disp: 120 tablet Refills: 5    Class: Normal Start: 12/2/2017   Documented:1 year ago  Last refill: 11/1/2017    Last office visit 11-08-17    Next office visit  12-20-17
Arterial Catheter

## 2020-08-17 RX ORDER — ONABOTULINUMTOXINA 200 [USP'U]/1
INJECTION, POWDER, LYOPHILIZED, FOR SOLUTION INTRADERMAL; INTRAMUSCULAR
Qty: 1 EACH | Refills: 3 | Status: SHIPPED | OUTPATIENT
Start: 2020-08-17 | End: 2021-07-22

## 2020-08-18 ENCOUNTER — TELEPHONE (OUTPATIENT)
Dept: PAIN MANAGEMENT | Age: 40
End: 2020-08-18

## 2020-08-19 ENCOUNTER — TELEPHONE (OUTPATIENT)
Dept: PAIN MANAGEMENT | Age: 40
End: 2020-08-19

## 2020-08-19 NOTE — TELEPHONE ENCOUNTER
Informed patient that fax will be sent to [facility] to update file to expedite the delivery of Botox for patient's appointment.

## 2020-08-20 ENCOUNTER — TELEPHONE (OUTPATIENT)
Dept: PAIN MANAGEMENT | Age: 40
End: 2020-08-20

## 2020-08-20 NOTE — TELEPHONE ENCOUNTER
Call placed to [facility] to verify fax received for PA for patient. Informed by Cecilia Morris that this was received. Informed Cecilia Morris that patient has appointment on Monday and requested to have case expedited. Also ask if the patient could call to expedite this case, and he stated that the new patient status has to be created.

## 2020-08-24 ENCOUNTER — TELEPHONE (OUTPATIENT)
Dept: PAIN MANAGEMENT | Age: 40
End: 2020-08-24

## 2020-08-24 ENCOUNTER — HOSPITAL ENCOUNTER (OUTPATIENT)
Dept: PAIN MANAGEMENT | Age: 40
Discharge: HOME | End: 2020-08-24
Payer: COMMERCIAL

## 2020-08-24 VITALS
DIASTOLIC BLOOD PRESSURE: 67 MMHG | OXYGEN SATURATION: 98 % | TEMPERATURE: 96.5 F | HEART RATE: 80 BPM | RESPIRATION RATE: 18 BRPM | SYSTOLIC BLOOD PRESSURE: 136 MMHG

## 2020-08-24 PROCEDURE — 64615 CHEMODENERV MUSC MIGRAINE: CPT | Performed by: PSYCHIATRY & NEUROLOGY

## 2020-08-24 PROCEDURE — 6360000002 HC RX W HCPCS

## 2020-08-24 PROCEDURE — 64615 CHEMODENERV MUSC MIGRAINE: CPT

## 2020-08-24 NOTE — PROGRESS NOTES
[id number] [facility] Neurology Botox Procedure Note     Patient:   America Christie  MR#:    [id number]  Account Number:                   [id number]      YOB: 1980  Date of Evaluation:  8/24/2020  Time of Note:                          1:23 PM  Primary Physician:    Andre Hsu DO   Consulting Physician:  Karan Mistry DO    Consent was signed and on the chart. Risk, benefits, and side effects discussed. Pt has a clear history of having more than 15 days/month of migraine, lasting more than 4 hours with multiple treatment failures. Vial Exp Date: 2/23  Shane Jackman Lot Number:  [id number] x 2    Botox was diluted with 0.9% NS to yield a final concentration of 50 units / 1 ml. The following muscles were injected in 0.1 ml (5 unit) increments:    -   5 units left, 5 units right  Procerus-      5 units  Frontalis-      20 units divided into 4 sites left and right  Temporalis-  40 units divided into 4 sites left and 4 sites right  Occipitalis-    30 units divided into 3 sites left and 3 sites right  Cervical Paraspinal-  20 units divided into 2 sites left and 2 sites right  Trapezius-     30 units divided into 3 sites left and 3 sites right    Total units injected: 155  Total units unavoidably discarded: 45    Pt tolerated the procedure well. There were no complications. Pt will follow up in 6 weeks to assess effectiveness and will repeat injections in 12 weeks if continues to benefit.         Karan Mistry DO  Board Certified Neurologist

## 2020-08-24 NOTE — PROCEDURES
[facility] Neurology Botox Procedure Note     Patient:   Jaja Toribio  MR#:    [id number]  Account Number:                   [id number]      YOB: 1980  Date of Evaluation:  8/24/2020  Time of Note:                          1:19 PM  Primary Physician:    Sebastien Dalton DO   Consulting Physician:  Dung Giles DO    Consent was signed and on the chart. Risk, benefits, and side effects discussed. Pt has a clear history of having more than 15 days/month of migraine, lasting more than 4 hours with multiple treatment failures. Vial Exp Date: 2/23  Wilfred Nick Lot Number:  [id number] x 2    Botox was diluted with 0.9% NS to yield a final concentration of 50 units / 1 ml. The following muscles were injected in 0.1 ml (5 unit) increments:    -   5 units left, 5 units right  Procerus-      5 units  Frontalis-      20 units divided into 4 sites left and right  Temporalis-  40 units divided into 4 sites left and 4 sites right  Occipitalis-    30 units divided into 3 sites left and 3 sites right  Cervical Paraspinal-  20 units divided into 2 sites left and 2 sites right  Trapezius-     30 units divided into 3 sites left and 3 sites right    Total units injected: 155  Total units unavoidably discarded: 45    Pt tolerated the procedure well. There were no complications. Pt will follow up in 6 weeks to assess effectiveness and will repeat injections in 12 weeks if continues to benefit.         Dung Giles DO  Board Certified Neurologist

## 2020-08-25 PROCEDURE — 6360000002 HC RX W HCPCS

## 2020-11-09 ENCOUNTER — TELEPHONE (OUTPATIENT)
Dept: PAIN MANAGEMENT | Age: 40
End: 2020-11-09

## 2020-11-12 ENCOUNTER — TELEPHONE (OUTPATIENT)
Dept: PAIN MANAGEMENT | Age: 40
End: 2020-11-12

## 2020-11-16 ENCOUNTER — TELEPHONE (OUTPATIENT)
Dept: PAIN MANAGEMENT | Age: 40
End: 2020-11-16

## 2020-11-16 ENCOUNTER — HOSPITAL ENCOUNTER (OUTPATIENT)
Dept: PAIN MANAGEMENT | Age: 40
Discharge: HOME | End: 2020-11-16
Payer: COMMERCIAL

## 2020-11-16 VITALS
DIASTOLIC BLOOD PRESSURE: 96 MMHG | OXYGEN SATURATION: 100 % | SYSTOLIC BLOOD PRESSURE: 170 MMHG | RESPIRATION RATE: 18 BRPM | TEMPERATURE: 96.6 F | HEART RATE: 70 BPM

## 2020-11-16 PROCEDURE — 64615 CHEMODENERV MUSC MIGRAINE: CPT | Performed by: PSYCHIATRY & NEUROLOGY

## 2020-11-16 PROCEDURE — 64615 CHEMODENERV MUSC MIGRAINE: CPT

## 2020-11-16 NOTE — PROGRESS NOTES
[facility] Neurology Botox Procedure Note     Patient:   Theopolis Peabody  MR#:    [id number]  Account Number:                   [id number]      YOB: 1980  Date of Evaluation:  11/16/2020  Time of Note:                          2:30 PM  Primary Physician:    Cedric Pearson DO   Consulting Physician:  Kimmie Jean-Baptiste DO    Consent was signed and on the chart. Risk, benefits, and side effects discussed. Pt has a clear history of having more than 15 days/month of migraine, lasting more than 4 hours with multiple treatment failures. Vial Exp Date: 11/21  Leela Osman Lot Number:  [id number]    Botox was diluted with 0.9% NS to yield a final concentration of 50 units / 1 ml. The following muscles were injected in 0.1 ml (5 unit) increments:    -   5 units left, 5 units right  Procerus-      5 units  Frontalis-      20 units divided into 4 sites left and right  Temporalis-  40 units divided into 4 sites left and 4 sites right  Occipitalis-    30 units divided into 3 sites left and 3 sites right  Cervical Paraspinal-  20 units divided into 2 sites left and 2 sites right  Trapezius-     30 units divided into 3 sites left and 3 sites right    Total units injected: 155  Total units unavoidably discarded: 45    Pt tolerated the procedure well. There were no complications. Pt will follow up in 6 weeks to assess effectiveness and will repeat injections in 12 weeks if continues to benefit.         Kimmie Jean-Baptiste DO  Board Certified Neurologist

## 2020-11-17 ENCOUNTER — TELEPHONE (OUTPATIENT)
Dept: PAIN MANAGEMENT | Age: 40
End: 2020-11-17

## 2020-11-17 NOTE — TELEPHONE ENCOUNTER
Call returned to patient, she has spoke with pharmacy about scheduling delivery for Botox. Patient was informed per pharmacy to return call in 24-48 hours.

## 2021-02-08 ENCOUNTER — HOSPITAL ENCOUNTER (OUTPATIENT)
Dept: PAIN MANAGEMENT | Age: 41
Discharge: HOME | End: 2021-02-08
Payer: COMMERCIAL

## 2021-02-08 VITALS
TEMPERATURE: 98.2 F | DIASTOLIC BLOOD PRESSURE: 106 MMHG | RESPIRATION RATE: 18 BRPM | SYSTOLIC BLOOD PRESSURE: 162 MMHG | HEART RATE: 82 BPM | OXYGEN SATURATION: 100 %

## 2021-02-08 PROCEDURE — 6360000002 HC RX W HCPCS

## 2021-02-08 PROCEDURE — 64615 CHEMODENERV MUSC MIGRAINE: CPT | Performed by: PSYCHIATRY & NEUROLOGY

## 2021-02-08 PROCEDURE — 64615 CHEMODENERV MUSC MIGRAINE: CPT

## 2021-02-08 NOTE — PROGRESS NOTES
[facility] Neurology Botox Procedure Note     Patient:   Maral Mendez  MR#:    [id number]  Account Number:                   [id number]      YOB: 1980  Date of Evaluation:  2/8/2021  Time of Note:                          1:16 PM  Primary Physician:    Alvaro Mancia DO   Consulting Physician:  Juan Francisco Stout DO    Consent was signed and on the chart. Risk, benefits, and side effects discussed. Pt has a clear history of having more than 15 days/month of migraine, lasting more than 4 hours with multiple treatment failures. Vial Exp Date: 10/23 x 2  Vial Lot Number:  [id number] x 2    Botox was diluted with 0.9% NS to yield a final concentration of 50 units / 1 ml. The following muscles were injected in 0.1 ml (5 unit) increments:    -   5 units left, 5 units right  Procerus-      5 units  Frontalis-      20 units divided into 4 sites left and right  Temporalis-  40 units divided into 4 sites left and 4 sites right  Occipitalis-    30 units divided into 3 sites left and 3 sites right  Cervical Paraspinal-  20 units divided into 2 sites left and 2 sites right  Trapezius-     30 units divided into 3 sites left and 3 sites right    Total units injected: 155  Total units unavoidably discarded: 45    Pt tolerated the procedure well. There were no complications. Pt will follow up in 6 weeks to assess effectiveness and will repeat injections in 12 weeks if continues to benefit.         Juan Francisco Stout DO  Board Certified Neurologist

## 2021-02-22 ENCOUNTER — TELEPHONE (OUTPATIENT)
Dept: PAIN MANAGEMENT | Age: 41
End: 2021-02-22

## 2021-02-22 NOTE — TELEPHONE ENCOUNTER
Call received from patient in regards to her insurance not covering her Botox. Patient was advised 11/17/2021 that she will need to schedule delivery for her Botox through [facility]. Patient now states that she never spoke with anyone about scheduling Botox. Patient given number to call [facility] to schedule delivery of the Botox Will follow up with [facility] to see if patient has schedule delivery for the Botox.

## 2021-05-03 ENCOUNTER — HOSPITAL ENCOUNTER (OUTPATIENT)
Dept: GENERAL RADIOLOGY | Facility: HOSPITAL | Age: 41
Discharge: HOME | End: 2021-05-03
Admitting: INTERNAL MEDICINE

## 2021-05-03 ENCOUNTER — TRANSCRIBE ORDERS (OUTPATIENT)
Dept: GENERAL RADIOLOGY | Facility: HOSPITAL | Age: 41
End: 2021-05-03

## 2021-05-03 ENCOUNTER — HOSPITAL ENCOUNTER (OUTPATIENT)
Dept: PAIN MANAGEMENT | Age: 41
Discharge: HOME | End: 2021-05-03
Payer: COMMERCIAL

## 2021-05-03 VITALS
SYSTOLIC BLOOD PRESSURE: 137 MMHG | OXYGEN SATURATION: 99 % | DIASTOLIC BLOOD PRESSURE: 74 MMHG | RESPIRATION RATE: 18 BRPM | HEART RATE: 65 BPM | TEMPERATURE: 96.8 F

## 2021-05-03 DIAGNOSIS — R10.9 LEFT FLANK PAIN: ICD-10-CM

## 2021-05-03 DIAGNOSIS — M62.830 BACK MUSCLE SPASM: ICD-10-CM

## 2021-05-03 DIAGNOSIS — M62.830 BACK MUSCLE SPASM: Primary | ICD-10-CM

## 2021-05-03 PROCEDURE — 64615 CHEMODENERV MUSC MIGRAINE: CPT

## 2021-05-03 PROCEDURE — 72110 X-RAY EXAM L-2 SPINE 4/>VWS: CPT

## 2021-05-03 PROCEDURE — 74018 RADEX ABDOMEN 1 VIEW: CPT

## 2021-05-03 PROCEDURE — 64615 CHEMODENERV MUSC MIGRAINE: CPT | Performed by: PSYCHIATRY & NEUROLOGY

## 2021-05-03 NOTE — PROGRESS NOTES
Procedure:  Level of Consciousness: [x]Alert []Oriented []Disoriented []Lethargic  Anxiety Level: [x]Calm [x]Anxious []Depressed []Other  Skin: [x]Warm [x]Dry []Cool []Moist []Intact []Other  Cardiovascular: []Palpitations: [x]Never []Occasionally []Frequently  Chest Pain: [x]No []Yes  Respiratory:  [x]Unlabored []Labored []Cough ([] Productive []Unproductive)  HCG Required: [x]No []Yes   Results: []Negative []Positive  Knowledge Level:        [x]Patient/Other verbalized understanding of pre-procedure instructions. [x]Assessment of post-op care needs (transportation, responsible caregiver)        [x]Able to discuss health care problems and how to deal with it.   Factors that Affect Teaching:        Language Barrier: [x]No []Yes - why:        Hearing Loss:        [x]No []Yes            Corrective Device:  []Yes []No        Vision Loss:           []No [x]Yes            Corrective Device:  [x]Yes []No        Memory Loss:       [x]No []Yes            []Short Term []Long Term  Motivational Level:  [x]Asks Questions                  []Extremely Anxious       [x]Seems Interested               []Seems Uninterested                  []Denies need for Education  Risk for Injury:  [x]Patient oriented to person, place and time  []History of frequent falls/loss of balance  Nutritional:  []Change in appetite   []Weight Gain   []Weight Loss  Functional:  []Requires assistance with ADL's

## 2021-05-03 NOTE — PROGRESS NOTES
[id number] [facility] Neurology Botox Procedure Note     Patient:   Raheel Jiménez  MR#:    [id number]  Account Number:                   [id number]      YOB: 1980  Date of Evaluation:  5/3/2021  Time of Note:                          3:52 PM  Primary Physician:    Meenu Cerna DO   Consulting Physician:  Mattie Soares DO    Consent was signed and on the chart. Risk, benefits, and side effects discussed. Pt has a clear history of having more than 15 days/month of migraine, lasting more than 4 hours with multiple treatment failures. Patient states that he/she has 0 headaches out of 30 days each month. Patient states that he/she has 3 migraines out of 30 days each month. Vial Exp Date: 10/23  Darkstrand Lot Number:  [id number]    Botox was diluted with 0.9% NS to yield a final concentration of 50 units / 1 ml. The following muscles were injected in 0.1 ml (5 unit) increments:    -   5 units left, 5 units right  Procerus-      5 units  Frontalis-      20 units divided into 4 sites left and right  Temporalis-  40 units divided into 4 sites left and 4 sites right  Occipitalis-    30 units divided into 3 sites left and 3 sites right  Cervical Paraspinal-  20 units divided into 2 sites left and 2 sites right  Trapezius-     30 units divided into 3 sites left and 3 sites right    Total units injected: 155  Total units unavoidably discarded: 45    Pt tolerated the procedure well. There were no complications. Pt will follow up in 6 weeks to assess effectiveness and will repeat injections in 12 weeks if continues to benefit.         Mattie Soares DO  Board Certified Neurologist

## 2021-05-03 NOTE — PROGRESS NOTES
Patient states that he/she has ___3___ headaches out of 30 days each month.   Patient states that he/she has ____0_ migraines out of 30 days each month.monthly

## 2021-06-18 ENCOUNTER — TELEPHONE (OUTPATIENT)
Dept: NEUROSURGERY | Age: 41
End: 2021-06-18

## 2021-06-18 NOTE — TELEPHONE ENCOUNTER
Called to let pt know her appt for 7/23 @ 9:15 has been moved to 7/22 @ 2:45 due to provider being out of the office. Left voicemail with call back number [phone].

## 2021-07-22 ENCOUNTER — TELEPHONE (OUTPATIENT)
Dept: NEUROSURGERY | Age: 41
End: 2021-07-22

## 2021-07-22 ENCOUNTER — OFFICE VISIT (OUTPATIENT)
Dept: NEUROSURGERY | Age: 41
End: 2021-07-22
Payer: COMMERCIAL

## 2021-07-22 VITALS
BODY MASS INDEX: 38.64 KG/M2 | HEART RATE: 89 BPM | SYSTOLIC BLOOD PRESSURE: 142 MMHG | HEIGHT: 62 IN | WEIGHT: 210 LBS | DIASTOLIC BLOOD PRESSURE: 87 MMHG | OXYGEN SATURATION: 100 % | TEMPERATURE: 97 F

## 2021-07-22 PROCEDURE — 99213 OFFICE O/P EST LOW 20 MIN: CPT | Performed by: NURSE PRACTITIONER

## 2021-07-22 RX ORDER — SERTRALINE HYDROCHLORIDE 25 MG/1
25 TABLET, FILM COATED ORAL DAILY
COMMUNITY
Start: 2021-06-30 | End: 2021-10-21 | Stop reason: ALTCHOICE

## 2021-07-22 RX ORDER — RIMEGEPANT SULFATE 75 MG/75MG
TABLET, ORALLY DISINTEGRATING ORAL
Qty: 8 TABLET | Refills: 3 | Status: SHIPPED | OUTPATIENT
Start: 2021-07-22 | End: 2021-10-21 | Stop reason: SDUPTHER

## 2021-07-22 RX ORDER — FENOFIBRATE 145 MG/1
145 TABLET, COATED ORAL 3 TIMES DAILY
COMMUNITY
Start: 2021-06-23

## 2021-07-22 RX ORDER — GALCANEZUMAB 120 MG/ML
120 INJECTION, SOLUTION SUBCUTANEOUS
Qty: 1 PEN | Refills: 5 | Status: SHIPPED | OUTPATIENT
Start: 2021-07-22 | End: 2021-10-21 | Stop reason: SDUPTHER

## 2021-07-22 NOTE — PROGRESS NOTES
[facility] Neurology Office Note      Patient:   Kolton Mesa  MR#:    [id number]  Account Number:                         YOB: 1980  Date of Evaluation:  2021  Time of Note:                          2:54 PM  Primary/Referring Physician:  [facility]    Consulting Physician:  Winsome Denton DNP, APRN    FOLLOW UP VISIT    Chief Complaint   Patient presents with    Follow-up     pt want to discuss medication treatment, botox not working anymore    Migraine     c/o 1-2 migraine a week        HISTORY OF PRESENT ILLNESS  Brandi Laura is here today for follow up. She is wanting to stop Botox today. She does not feel like it is helping any more. She has noted increase in headache frequency. No change in characteristics of headaches. Headache pain is frontal with radiation globally. She notes photophobia, nausea, scotoma like visual changes with \"spots\" in her vision with headaches. She has had prior hospitalizations for right sided weakness/headaches, felt to be hemiplegic migraine. Has tried and failed Topamax, Verapamil, Lamictal, Gabapentin. She is taking Fioicet prn but this is not helpful. She is noting 8 migraines in a month now. No other complaints.      Past Medical History:   Diagnosis Date    Anxiety     Depression     Hemiplegic migraine     Hypertension     Migraine        Past Surgical History:   Procedure Laterality Date     SECTION      HYSTERECTOMY         Family History   Problem Relation Age of Onset    Ataxia Mother     High Blood Pressure Mother     High Blood Pressure Father     COPD Father        Social History     Socioeconomic History    Marital status:      Spouse name: Not on file    Number of children: Not on file    Years of education: Not on file    Highest education level: Not on file   Occupational History    Not on file   Tobacco Use    Smoking status: Never Smoker    Smokeless tobacco: Never Used   Vaping Use    Vaping Use: Never used   Substance and Sexual Activity    Alcohol use: No    Drug use: Yes     Types: Marijuana     Comment: edibles     Sexual activity: Yes     Partners: Male   Other Topics Concern    Not on file   Social History Narrative    Not on file     Social Determinants of Health     Financial Resource Strain:     Difficulty of Paying Living Expenses:    Food Insecurity:     Worried About Running Out of Food in the Last Year:     [address] in the Last Year:    Transportation Needs:     Lack of Transportation (Medical):  Lack of Transportation (Non-Medical):    Physical Activity:     Days of Exercise per Week:     Minutes of Exercise per Session:    Stress:     Feeling of Stress :    Social Connections:     Frequency of Communication with Friends and Family:     Frequency of Social Gatherings with Friends and Family:     Attends [employer] Services:     Active Member of Clubs or Organizations:     Attends Club or Organization Meetings:     Marital Status:    Intimate Partner Violence:     Fear of Current or Ex-Partner:     Emotionally Abused:     Physically Abused:     Sexually Abused:        Current Outpatient Medications   Medication Sig Dispense Refill    sertraline (ZOLOFT) 25 MG tablet Take 25 mg by mouth daily      fenofibrate (TRICOR) 145 MG tablet Take 145 mg by mouth 3 times daily      Galcanezumab-gnlm (EMGALITY) 120 MG/ML SOAJ Inject 120 mg into the skin every 30 days 1 pen 5    Rimegepant Sulfate (NURTEC) 75 MG TBDP Take 1 tablet at the onset of migraine. Do not exceed 1 tablet in 24 hours. 8 tablet 3    metoprolol succinate (TOPROL XL) 25 MG extended release tablet Take 25 mg by mouth daily  6    OLANZapine (ZYPREXA) 2.5 MG tablet Take 1 tablet by mouth 2 times daily  6     No current facility-administered medications for this visit. ALLERGIES  No Known Allergies    REVIEW OF SYSTEMS  Constitutional: []? Fever []? Sweats []? Chills []? Recent Injury [x]?  Denies all unless marked  HEENT:[x]? Headache  []? Head Injury []? Hearing Loss  []? Sore Throat  []? Ear Ache [x]? Denies all unless marked  Spine:  []? Neck pain  []? Back pain  []? Sciaticia  [x]? Denies all unless marked  Cardiovascular:[]? Heart Disease []? Palpitations []? Chest Pain   [x]? Denies all unless marked  Pulmonary: []? Shortness of Breath []? Cough   [x]? Denies all unless marked  Psychiatric/Behavioral:[]? Depression []? Anxiety [x]? Denies all unless marked  Gastrointestinal: []? Nausea  []? Vomiting  []? Abdominal Pain  []? Constipation  []? Diarrhea  [x]? Denies all unless marked  Genitourinary:   []? Frequency  []? Urgency  []? Dysuria []? Incontinence  [x]? Denies all unless marked  Extremities: []? Pain  []? Swelling  [x]? Denies all unless marked  Musculoskeletal: []? Myalgias  []? Joint Pain  []? Arthritis []? Muscle Cramps []? Muscle Twitches  [x]? Denies all unless marked  Sleep: []? Insomnia[]? Snoring []? Restless Legs  []? Sleep Apnea  []? Daytime Sleepiness  [x]? Denies all unless marked  Skin:[]? Rash []? Color Change [x]? Denies all unless marked   Neurological:[]? Visual Disturbance []? Memory Loss []? Loss of Balance []? Slurred Speech []? Weakness []? Seizures  []? Dizziness [x]? Denies all unless marked    The MA has completed the ROS with the patient. I have reviewed it in its' entirety with the patient and agree with the documentation.      PHYSICAL EXAM  Constitutional    BP (!) 142/87   Pulse 89   Temp 97 °F (36.1 °C)   Ht 5' 2\" (1.575 m)   Wt 210 lb (95.3 kg)   SpO2 100%   Breastfeeding No   BMI 38.41 kg/m²   General appearance: No acute distress   EYES -   Conjunctiva normal  Pupillary exam as below, see CN exam in the neurologic exam  ENT-    No scars, masses, or lesions over external nose or ears  Hearing normal bilaterally to finger rub  Cardiovascular -   RRR  No clubbing, cyanosis, or edema   Respiratory-   Good expansion, normal effort without use of accessory muscles  CTA  Musculoskeletal  No significant wasting of muscles noted  Gait as below, see gait exam in the neurologic exam  Muscle strength, tone, stability as below. No bony deformities  Skin    Warm, dry, and intact to inspection and palpation. No rash, erythema, or pallor  Psychiatric    Mood, affect, and behavior appear normal    Memory as below see mental status examination in the neurologic exam      NEUROLOGICAL EXAM    Mental status   [x]Awake, alert, oriented   [x]Affect attention and concentration appear appropriate  [x]Recent and remote memory appears unremarkable  [x]Speech normal without dysarthria or aphasia, comprehension and repetition intact. COMMENTS:    Cranial Nerves [x]No VF deficit to confrontation,  no papilledema on fundoscopic exam.  [x]PERRLA, EOMI, no nystagmus, conjugate eye movements, no ptosis  [x]Face symmetric  [x]Facial sensation intact  [x]Tongue midline no atrophy or fasciculations present  [x]Palate midline, hearing to finger rub normal bilaterally  [x]Shoulder shrug and SCM testing normal bilaterally  COMMENTS:   Motor   [x]5/5 strength x 4 extremities  [x]Normal bulk and tone  [x]No tremor present  [x]No rigidity or bradykinesia noted  COMMENTS:   Sensory  [x]Sensation intact to light touch, pin prick, vibration, and proprioception BLE  []Sensation intact to light touch, pin prick, vibration, and proprioception BUE  COMMENTS:   Coordination [x]FTN normal bilaterally   [x]HTS normal bilaterally  [x]MELLISA normal bilaterally. COMMENTS:   Reflexes  [x]Symmetric and non-pathological  [x]Toes down going bilaterally  [x]No clonus present  COMMENTS:   Gait                  [x]Normal steady gait    []Ataxic    []Spastic     []Magnetic     []Shuffling  COMMENTS:       LABS RECORD AND IMAGING REVIEW (As below and per HPI)    Records reviewed. Prior MRI/MRA/EEG/CD normal.     EEG was normal as well. ASSESSMENT:    Senait Jimenez is a 37 y.o. female here for follow up of headaches.  She is wanting to stop Botox therapy, doesn't feel as though it is effective any longer. Has noted an increase in migraine frequency recently. No change in characteristics. Prior work up including MRI brain, MRA head, EEG normal. Suspect Suspect migraine variant with basilar component, possible hemiplegic migraine. Will switch to Emgality and add Nurtec prn. Avoid triptans given hemiplegic variant. Diagnosis Orders   1. Chronic migraine          PLAN:  1. Emgality 240mg today then 120mg monthly. Discussed side effects with patient. Educated on injection technique   2. Nurtec ODT prn migraine. Discussed side effects with patient. 3. Stop Botox and Fioircet   4.  Follow up in 3 months, sooner with any worsening     Dirk Sharpsonja SHAFER, APRN

## 2021-07-30 ENCOUNTER — NURSE TRIAGE (OUTPATIENT)
Dept: CALL CENTER | Facility: HOSPITAL | Age: 41
End: 2021-07-30

## 2021-07-30 RX ORDER — ONABOTULINUMTOXINA 200 [USP'U]/1
INJECTION, POWDER, LYOPHILIZED, FOR SOLUTION INTRADERMAL; INTRAMUSCULAR
Qty: 1 EACH | Refills: 3 | Status: SHIPPED | OUTPATIENT
Start: 2021-07-30 | End: 2021-10-21 | Stop reason: ALTCHOICE

## 2021-07-31 NOTE — TELEPHONE ENCOUNTER
Reason for Disposition  • Patient sounds very sick or weak to the triager    Additional Information  • Negative: SEVERE difficulty breathing (e.g., struggling for each breath, speaks in single words)  • Negative: Difficult to awaken or acting confused (e.g., disoriented, slurred speech)  • Negative: Bluish (or gray) lips or face now  • Negative: Shock suspected (e.g., cold/pale/clammy skin, too weak to stand, low BP, rapid pulse)  • Negative: Sounds like a life-threatening emergency to the triager  • Negative: [1] COVID-19 exposure AND [2] has not completed COVID-19 vaccine series AND [3] no symptoms  • Negative: [1] COVID-19 exposure AND [2] completed COVID-19 vaccine series (fully vaccinated) AND [3] no symptoms  • Negative: COVID-19 vaccine reaction suspected (e.g., fever, headache, muscle aches) occurring during days 1-3 after getting vaccine  • Negative: COVID-19 vaccine, questions about  • Negative: [1] COVID-19 vaccine series completed (fully vaccinated) AND [2] new-onset of COVID-19 symptoms BUT [3] no known exposure  • Negative: [1] Had lab test confirmed COVID-19 infection within last 3 months AND [2] new-onset of COVID-19 symptoms BUT [3] no known exposure  • Negative: [1] Lives with someone known to have influenza (flu test positive) AND [2] flu-like symptoms (e.g., cough, runny nose, sore throat, SOB; with or without fever)  • Negative: [1] Adult with possible COVID-19 symptoms AND [2] triager concerned about severity of symptoms or other causes  • Negative: COVID-19 and breastfeeding, questions about  • Negative: SEVERE or constant chest pain or pressure (Exception: mild central chest pain, present only when coughing)  • Negative: MODERATE difficulty breathing (e.g., speaks in phrases, SOB even at rest, pulse 100-120)  • Negative: [1] Headache AND [2] stiff neck (can't touch chin to chest)  • Negative: MILD difficulty breathing (e.g., minimal/no SOB at rest, SOB with walking, pulse <100)  •  "Negative: Chest pain or pressure    Answer Assessment - Initial Assessment Questions  1. COVID-19 DIAGNOSIS: \"Who made your Coronavirus (COVID-19) diagnosis?\" \"Was it confirmed by a positive lab test?\" If not diagnosed by a HCP, ask \"Are there lots of cases (community spread) where you live?\" (See public health department website, if unsure)   Suspected, has not been diagnosed.    2. COVID-19 EXPOSURE: \"Was there any known exposure to COVID before the symptoms began?\" CDC Definition of close contact: within 6 feet (2 meters) for a total of 15 minutes or more over a 24-hour period.       Unknown   3. ONSET: \"When did the COVID-19 symptoms start?\"       today  4. WORST SYMPTOM: \"What is your worst symptom?\" (e.g., cough, fever, shortness of breath, muscle aches)      Vomiting, fever    5. COUGH: \"Do you have a cough?\" If Yes, ask: \"How bad is the cough?\"        No   6. FEVER: \"Do you have a fever?\" If Yes, ask: \"What is your temperature, how was it measured, and when did it start?\"      Yes 100.5  7. RESPIRATORY STATUS: \"Describe your breathing?\" (e.g., shortness of breath, wheezing, unable to speak)       Breathing is okay.    8. BETTER-SAME-WORSE: \"Are you getting better, staying the same or getting worse compared to yesterday?\"  If getting worse, ask, \"In what way?\"     Worse   9. HIGH RISK DISEASE: \"Do you have any chronic medical problems?\" (e.g., asthma, heart or lung disease, weak immune system, obesity, etc.)      No.    10. PREGNANCY: \"Is there any chance you are pregnant?\" \"When was your last menstrual period?\"        No   11. OTHER SYMPTOMS: \"Do you have any other symptoms?\"  (e.g., chills, fatigue, headache, loss of smell or taste, muscle pain, sore throat; new loss of smell or taste especially support the diagnosis of COVID-19)        Headache, fatigue, vomiting.    Protocols used: CORONAVIRUS (COVID-19) DIAGNOSED OR SUSPECTED-ADULT-AH      "

## 2021-10-21 ENCOUNTER — OFFICE VISIT (OUTPATIENT)
Dept: NEUROSURGERY | Age: 41
End: 2021-10-21
Payer: COMMERCIAL

## 2021-10-21 VITALS
TEMPERATURE: 98.9 F | DIASTOLIC BLOOD PRESSURE: 80 MMHG | BODY MASS INDEX: 38.64 KG/M2 | WEIGHT: 210 LBS | HEIGHT: 62 IN | SYSTOLIC BLOOD PRESSURE: 141 MMHG | OXYGEN SATURATION: 98 % | HEART RATE: 87 BPM

## 2021-10-21 DIAGNOSIS — G43.009 MIGRAINE WITHOUT AURA AND WITHOUT STATUS MIGRAINOSUS, NOT INTRACTABLE: Primary | ICD-10-CM

## 2021-10-21 PROCEDURE — 99213 OFFICE O/P EST LOW 20 MIN: CPT | Performed by: NURSE PRACTITIONER

## 2021-10-21 RX ORDER — RIMEGEPANT SULFATE 75 MG/75MG
TABLET, ORALLY DISINTEGRATING ORAL
Qty: 8 TABLET | Refills: 3 | Status: SHIPPED | OUTPATIENT
Start: 2021-10-21 | End: 2022-03-22 | Stop reason: SDUPTHER

## 2021-10-21 RX ORDER — GALCANEZUMAB 120 MG/ML
120 INJECTION, SOLUTION SUBCUTANEOUS
Qty: 1 PEN | Refills: 5 | Status: SHIPPED | OUTPATIENT
Start: 2021-10-21 | End: 2021-12-21 | Stop reason: SDUPTHER

## 2021-10-21 NOTE — PROGRESS NOTES
[id number] [facility] Neurology Office Note      Patient:   Ryann Paredes  MR#:    [id number]  Account Number:                         YOB: 1980  Date of Evaluation:  10/21/2021  Time of Note:                          3:19 PM  Primary/Referring Physician:  Aaron Smith DO   Consulting Physician:  Kedar Rahman DNP, APRN    FOLLOW UP VISIT    Chief Complaint   Patient presents with    3 Month Follow-Up     pt states things are better today    Migraine       HISTORY OF PRESENT ILLNESS  Brandi Crane is here today for follow up. She has noted improvement in headaches. No change in characteristics of headaches. Headache pain is frontal with radiation globally. She notes photophobia, nausea, scotoma like visual changes with \"spots\" in her vision with headaches. She has had prior hospitalizations for right sided weakness/headaches, felt to be hemiplegic migraine. Noting 1-2 migraines in a month now. On Emgality and taking Nurtec prn. Multiple treatment failures including Botox, Topamax, Verapamil, Lamictal, Gabapentin and Fioricet. Much improved.      Past Medical History:   Diagnosis Date    Anxiety     Depression     Hemiplegic migraine     Hypertension     Migraine        Past Surgical History:   Procedure Laterality Date     SECTION      HYSTERECTOMY         Family History   Problem Relation Age of Onset    Ataxia Mother     High Blood Pressure Mother     High Blood Pressure Father     COPD Father        Social History     Socioeconomic History    Marital status:      Spouse name: Not on file    Number of children: Not on file    Years of education: Not on file    Highest education level: Not on file   Occupational History    Not on file   Tobacco Use    Smoking status: Never Smoker    Smokeless tobacco: Never Used   Vaping Use    Vaping Use: Never used   Substance and Sexual Activity    Alcohol use: No    Drug use: Yes     Types: Marijuana     Comment: edibles     Sexual activity: Yes     Partners: Male   Other Topics Concern    Not on file   Social History Narrative    Not on file     Social Determinants of Health     Financial Resource Strain:     Difficulty of Paying Living Expenses:    Food Insecurity:     Worried About Running Out of Food in the Last Year:     [address] in the Last Year:    Transportation Needs:     Lack of Transportation (Medical):  Lack of Transportation (Non-Medical):    Physical Activity:     Days of Exercise per Week:     Minutes of Exercise per Session:    Stress:     Feeling of Stress :    Social Connections:     Frequency of Communication with Friends and Family:     Frequency of Social Gatherings with Friends and Family:     Attends [employer] Services:     Active Member of Clubs or Organizations:     Attends Club or Organization Meetings:     Marital Status:    Intimate Partner Violence:     Fear of Current or Ex-Partner:     Emotionally Abused:     Physically Abused:     Sexually Abused:        Current Outpatient Medications   Medication Sig Dispense Refill    sertraline (ZOLOFT) 50 MG tablet Take 50 mg by mouth nightly      Rimegepant Sulfate (NURTEC) 75 MG TBDP Take 1 tablet at the onset of migraine. Do not exceed 1 tablet in 24 hours. 8 tablet 3    Galcanezumab-gnlm (EMGALITY) 120 MG/ML SOAJ Inject 120 mg into the skin every 30 days 1 pen 5    fenofibrate (TRICOR) 145 MG tablet Take 145 mg by mouth 3 times daily      metoprolol succinate (TOPROL XL) 25 MG extended release tablet Take 25 mg by mouth daily  6    OLANZapine (ZYPREXA) 2.5 MG tablet Take 1 tablet by mouth 2 times daily  6     No current facility-administered medications for this visit. ALLERGIES  No Known Allergies    REVIEW OF SYSTEMS  Constitutional: []? Fever []? Sweats []? Chills []? Recent Injury [x]? Denies all unless marked  HEENT:[]? Headache  []? Head Injury []? Hearing Loss  []? Sore Throat  []? Ear Ache [x]?  Denies all unless marked  Spine: []? Neck pain  []? Back pain  []? Sciaticia  [x]? Denies all unless marked  Cardiovascular:[]? Heart Disease []? Palpitations []? Chest Pain   [x]? Denies all unless marked  Pulmonary: []? Shortness of Breath []? Cough   [x]? Denies all unless marked  Psychiatric/Behavioral:[]? Depression []? Anxiety [x]? Denies all unless marked  Gastrointestinal: []? Nausea  []? Vomiting  []? Abdominal Pain  []? Constipation  []? Diarrhea  [x]? Denies all unless marked  Genitourinary:   []? Frequency  []? Urgency  []? Dysuria []? Incontinence  [x]? Denies all unless marked  Extremities: []? Pain  []? Swelling  [x]? Denies all unless marked  Musculoskeletal: []? Myalgias  []? Joint Pain  []? Arthritis []? Muscle Cramps []? Muscle Twitches  [x]? Denies all unless marked  Sleep: []? Insomnia[]? Snoring []? Restless Legs  []? Sleep Apnea  []? Daytime Sleepiness  [x]? Denies all unless marked  Skin:[]? Rash []? Color Change [x]? Denies all unless marked   Neurological:[]? Visual Disturbance []? Memory Loss []? Loss of Balance []? Slurred Speech []? Weakness []? Seizures  []? Dizziness [x]? Denies all unless marked    The MA has completed the ROS with the patient. I have reviewed it in its' entirety with the patient and agree with the documentation. PHYSICAL EXAM  Constitutional    BP (!) 141/80   Pulse 87   Temp 98.9 °F (37.2 °C)   Ht 5' 2\" (1.575 m)   Wt 210 lb (95.3 kg)   SpO2 98%   BMI 38.41 kg/m²   General appearance: No acute distress   EYES -   Conjunctiva normal  Pupillary exam as below, see CN exam in the neurologic exam  ENT-    No scars, masses, or lesions over external nose or ears  Hearing normal bilaterally to finger rub  Cardiovascular -   RRR  No clubbing, cyanosis, or edema   Respiratory-   Good expansion, normal effort without use of accessory muscles  CTA  Musculoskeletal    No significant wasting of muscles noted  Gait as below, see gait exam in the neurologic exam  Muscle strength, tone, stability as below.    No bony deformities  Skin    Warm, dry, and intact to inspection and palpation. No rash, erythema, or pallor  Psychiatric    Mood, affect, and behavior appear normal    Memory as below see mental status examination in the neurologic exam      NEUROLOGICAL EXAM    Mental status   [x]Awake, alert, oriented   [x]Affect attention and concentration appear appropriate  [x]Recent and remote memory appears unremarkable  [x]Speech normal without dysarthria or aphasia, comprehension and repetition intact. COMMENTS:    Cranial Nerves [x]No VF deficit to confrontation,  no papilledema on fundoscopic exam.  [x]PERRLA, EOMI, no nystagmus, conjugate eye movements, no ptosis  [x]Face symmetric  [x]Facial sensation intact  [x]Tongue midline no atrophy or fasciculations present  [x]Palate midline, hearing to finger rub normal bilaterally  [x]Shoulder shrug and SCM testing normal bilaterally  COMMENTS:   Motor   [x]5/5 strength x 4 extremities  [x]Normal bulk and tone  [x]No tremor present  [x]No rigidity or bradykinesia noted  COMMENTS:   Sensory  [x]Sensation intact to light touch, pin prick, vibration, and proprioception BLE  []Sensation intact to light touch, pin prick, vibration, and proprioception BUE  COMMENTS:   Coordination [x]FTN normal bilaterally   [x]HTS normal bilaterally  [x]MELLISA normal bilaterally. COMMENTS:   Reflexes  [x]Symmetric and non-pathological  [x]Toes down going bilaterally  [x]No clonus present  COMMENTS:   Gait                  [x]Normal steady gait    []Ataxic    []Spastic     []Magnetic     []Shuffling  COMMENTS:       LABS RECORD AND IMAGING REVIEW (As below and per HPI)    Records reviewed. Prior MRI/MRA/EEG/CD normal.     EEG was normal as well. ASSESSMENT:    Gregory Pollack is a 37 y.o. female here for follow up of headaches. She has noted improvement in headaches since last starting Emgality.  Prior work up including MRI brain, MRA head, EEG normal. Suspect migraine variant with basilar

## 2021-12-22 RX ORDER — GALCANEZUMAB 120 MG/ML
120 INJECTION, SOLUTION SUBCUTANEOUS
Qty: 1 PEN | Refills: 5 | Status: SHIPPED | OUTPATIENT
Start: 2021-12-22 | End: 2022-04-20 | Stop reason: SDUPTHER

## 2021-12-22 NOTE — TELEPHONE ENCOUNTER
David Estrada has requested a refill on her medication.       Last office visit : 10/21/2021   Next office visit : 4/20/2022   Last medication refill :10/21/2021      Requested Prescriptions     Pending Prescriptions Disp Refills    Galcanezumab-gnlm (EMGALITY) 120 MG/ML SOAJ 1 pen 5     Sig: Inject 120 mg into the skin every 30 days

## 2022-03-23 RX ORDER — RIMEGEPANT SULFATE 75 MG/75MG
TABLET, ORALLY DISINTEGRATING ORAL
Qty: 8 TABLET | Refills: 3 | Status: SHIPPED | OUTPATIENT
Start: 2022-03-23 | End: 2022-04-20 | Stop reason: SDUPTHER

## 2022-03-23 RX ORDER — RIMEGEPANT SULFATE 75 MG/75MG
TABLET, ORALLY DISINTEGRATING ORAL
Qty: 8 TABLET | Refills: 3 | OUTPATIENT
Start: 2022-03-23

## 2022-03-23 NOTE — TELEPHONE ENCOUNTER
Requested Prescriptions     Pending Prescriptions Disp Refills    Rimegepant Sulfate (NURTEC) 75 MG TBDP 8 tablet 3     Sig: Take 1 tablet at the onset of migraine. Do not exceed 1 tablet in 24 hours.        Last Office Visit: 10/21/2021  Next Office Visit: 4/20/2022  Last Medication Refill: 10/21/21 with 3 refills

## 2022-04-20 ENCOUNTER — OFFICE VISIT (OUTPATIENT)
Dept: NEUROSURGERY | Age: 42
End: 2022-04-20
Payer: COMMERCIAL

## 2022-04-20 VITALS
BODY MASS INDEX: 38.64 KG/M2 | SYSTOLIC BLOOD PRESSURE: 134 MMHG | OXYGEN SATURATION: 99 % | DIASTOLIC BLOOD PRESSURE: 79 MMHG | TEMPERATURE: 96.9 F | HEART RATE: 70 BPM | HEIGHT: 62 IN | WEIGHT: 210 LBS

## 2022-04-20 DIAGNOSIS — G43.009 MIGRAINE WITHOUT AURA AND WITHOUT STATUS MIGRAINOSUS, NOT INTRACTABLE: Primary | ICD-10-CM

## 2022-04-20 PROCEDURE — 99213 OFFICE O/P EST LOW 20 MIN: CPT | Performed by: NURSE PRACTITIONER

## 2022-04-20 RX ORDER — GALCANEZUMAB 120 MG/ML
120 INJECTION, SOLUTION SUBCUTANEOUS
Qty: 1 PEN | Refills: 5 | Status: SHIPPED | OUTPATIENT
Start: 2022-04-20 | End: 2022-06-30 | Stop reason: SDUPTHER

## 2022-04-20 RX ORDER — RIMEGEPANT SULFATE 75 MG/75MG
TABLET, ORALLY DISINTEGRATING ORAL
Qty: 8 TABLET | Refills: 3 | Status: SHIPPED | OUTPATIENT
Start: 2022-04-20 | End: 2022-06-30 | Stop reason: SDUPTHER

## 2022-04-20 NOTE — PROGRESS NOTES
[id number] [facility] Neurology Office Note      Patient:   Frederick Barcenas  MR#:    [id number]  Account Number:                         YOB: 1980  Date of Evaluation:  2022  Time of Note:                          2:47 PM  Primary/Referring Physician:  Steve Prado DO   Consulting Physician:  Alma Rosa Baeza DNP, APRN    FOLLOW UP VISIT    Chief Complaint   Patient presents with    Follow-up     pt states things are better    Migraine       HISTORY OF PRESENT ILLNESS  Brandi Vasquez is here today for follow up. She continues to note good improvement in headaches. Noting 1 headache in a month at most, some months she might not even have a headache. No change in characteristics of headaches. Headache pain is frontal with radiation globally. She notes photophobia, nausea, scotoma like visual changes with \"spots\" in her vision with headaches. She has had prior hospitalizations for right sided weakness/headaches, felt to be hemiplegic migraine. She has not had any weakness with migraines recently. On Emgality and taking Nurtec prn. Multiple treatment failures including Botox, Topamax, Verapamil, Lamictal, Gabapentin and Fioricet. Much improved.      Past Medical History:   Diagnosis Date    Anxiety     Depression     Hemiplegic migraine     Hypertension     Migraine        Past Surgical History:   Procedure Laterality Date     SECTION      HYSTERECTOMY         Family History   Problem Relation Age of Onset    Ataxia Mother     High Blood Pressure Mother     High Blood Pressure Father     COPD Father        Social History     Socioeconomic History    Marital status:      Spouse name: Not on file    Number of children: Not on file    Years of education: Not on file    Highest education level: Not on file   Occupational History    Not on file   Tobacco Use    Smoking status: Never Smoker    Smokeless tobacco: Never Used   Vaping Use    Vaping Use: Never used   Substance and Sexual Activity    Alcohol use: No    Drug use: Yes     Types: Marijuana Rodena Capes)     Comment: edibles     Sexual activity: Yes     Partners: Male   Other Topics Concern    Not on file   Social History Narrative    Not on file     Social Determinants of Health     Financial Resource Strain:     Difficulty of Paying Living Expenses: Not on file   Food Insecurity:     Worried About Running Out of Food in the Last Year: Not on file    Magdaleno of Food in the Last Year: Not on file   Transportation Needs:     Lack of Transportation (Medical): Not on file    Lack of Transportation (Non-Medical): Not on file   Physical Activity:     Days of Exercise per Week: Not on file    Minutes of Exercise per Session: Not on file   Stress:     Feeling of Stress : Not on file   Social Connections:     Frequency of Communication with Friends and Family: Not on file    Frequency of Social Gatherings with Friends and Family: Not on file    Attends Yazdanism Services: Not on file    Active Member of [address] [place] [address] or Organizations: Not on file    Attends Club or Organization Meetings: Not on file    Marital Status: Not on file   Intimate Partner Violence:     Fear of Current or Ex-Partner: Not on file    Emotionally Abused: Not on file    Physically Abused: Not on file    Sexually Abused: Not on file   Housing Stability:     Unable to Pay for Housing in the Last Year: Not on file    Number of [place] in the Last Year: Not on file    Unstable Housing in the Last Year: Not on file       Current Outpatient Medications   Medication Sig Dispense Refill    Rimegepant Sulfate (NURTEC) 75 MG TBDP Take 1 tablet at the onset of migraine. Do not exceed 1 tablet in 24 hours.  8 tablet 3    Galcanezumab-gnlm (EMGALITY) 120 MG/ML SOAJ Inject 120 mg into the skin every 30 days 1 pen 5    sertraline (ZOLOFT) 50 MG tablet Take 50 mg by mouth nightly      fenofibrate (TRICOR) 145 MG tablet Take 145 mg by mouth 3 times daily      metoprolol succinate (TOPROL XL) 50 MG extended release tablet Take 50 mg by mouth daily   6    OLANZapine (ZYPREXA) 2.5 MG tablet Take 1 tablet by mouth 2 times daily  6     No current facility-administered medications for this visit. ALLERGIES  No Known Allergies    REVIEW OF SYSTEMS  Constitutional: []? Fever []? Sweats []? Chills []? Recent Injury [x]? Denies all unless marked  HEENT:[x]? Headache  []? Head Injury []? Hearing Loss  []? Sore Throat  []? Ear Ache [x]? Denies all unless marked  Spine:  []? Neck pain  []? Back pain  []? Sciaticia  [x]? Denies all unless marked  Cardiovascular:[]? Heart Disease []? Palpitations []? Chest Pain   [x]? Denies all unless marked  Pulmonary: []? Shortness of Breath []? Cough   [x]? Denies all unless marked  Psychiatric/Behavioral:[]? Depression []? Anxiety [x]? Denies all unless marked  Gastrointestinal: []? Nausea  []? Vomiting  []? Abdominal Pain  []? Constipation  []? Diarrhea  [x]? Denies all unless marked  Genitourinary:   []? Frequency  []? Urgency  []? Dysuria []? Incontinence  [x]? Denies all unless marked  Extremities: []? Pain  []? Swelling  [x]? Denies all unless marked  Musculoskeletal: []? Myalgias  []? Joint Pain  []? Arthritis []? Muscle Cramps []? Muscle Twitches  [x]? Denies all unless marked  Sleep: []? Insomnia[]? Snoring []? Restless Legs  []? Sleep Apnea  []? Daytime Sleepiness  [x]? Denies all unless marked  Skin:[]? Rash []? Color Change [x]? Denies all unless marked   Neurological:[]? Visual Disturbance []? Memory Loss []? Loss of Balance []? Slurred Speech []? Weakness []? Seizures  []? Dizziness [x]? Denies all unless marked    The MA has completed the ROS with the patient. I have reviewed it in its' entirety with the patient and agree with the documentation.      PHYSICAL EXAM  Constitutional -   /79   Pulse 70   Temp 96.9 °F (36.1 °C)   Ht 5' 2\" (1.575 m)   Wt 210 lb (95.3 kg)   SpO2 99%   BMI 38.41 kg/m²   General appearance: No acute distress   EYES - Conjunctiva normal  Pupillary exam as below, see CN exam in the neurologic exam  ENT-    No scars, masses, or lesions over external nose or ears  Hearing normal bilaterally to finger rub  Cardiovascular -   RRR  No clubbing, cyanosis, or edema   Respiratory-   Good expansion, normal effort without use of accessory muscles  CTA  Musculoskeletal -   No significant wasting of muscles noted  Gait as below, see gait exam in the neurologic exam  Muscle strength, tone, stability as below. No bony deformities  Skin -   Warm, dry, and intact to inspection and palpation. No rash, erythema, or pallor  Psychiatric -   Mood, affect, and behavior appear normal    Memory as below see mental status examination in the neurologic exam      NEUROLOGICAL EXAM    Mental status   [x]Awake, alert, oriented   [x]Affect attention and concentration appear appropriate  [x]Recent and remote memory appears unremarkable  [x]Speech normal without dysarthria or aphasia, comprehension and repetition intact. COMMENTS:    Cranial Nerves [x]No VF deficit to confrontation,  no papilledema on fundoscopic exam.  [x]PERRLA, EOMI, no nystagmus, conjugate eye movements, no ptosis  [x]Face symmetric  [x]Facial sensation intact  [x]Tongue midline no atrophy or fasciculations present  [x]Palate midline, hearing to finger rub normal bilaterally  [x]Shoulder shrug and SCM testing normal bilaterally  COMMENTS:   Motor   [x]5/5 strength x 4 extremities  [x]Normal bulk and tone  [x]No tremor present  [x]No rigidity or bradykinesia noted  COMMENTS:   Sensory  [x]Sensation intact to light touch, pin prick, vibration, and proprioception BLE  []Sensation intact to light touch, pin prick, vibration, and proprioception BUE  COMMENTS:   Coordination [x]FTN normal bilaterally   [x]HTS normal bilaterally  [x]MELLISA normal bilaterally.    COMMENTS:   Reflexes  [x]Symmetric and non-pathological  [x]Toes down going bilaterally  [x]No clonus present  COMMENTS:   Gait [x]Normal steady gait    []Ataxic    []Spastic     []Magnetic     []Shuffling  COMMENTS:       LABS RECORD AND IMAGING REVIEW (As below and per HPI)    Records reviewed. Prior MRI/MRA/EEG/CD normal.     EEG was normal as well. ASSESSMENT:    Chris Anthony is a 40 y.o. female here for follow up of headaches. She has noted improvement in headaches since starting Emgality. Noting 1 migraine a month at most. On Emgality and taking Nurtec prn. Avoid triptans given hemiplegic variant. Prior work up including MRI brain, MRA head, EEG normal. Suspect migraine variant with basilar component, possible hemiplegic migraine. Diagnosis Orders   1. Migraine without aura and without status migrainosus, not intractable        PLAN:  1. Continue Emgality 120mg monthly. Discussed side effects with patient. 2. Continue Nurtec prn   3.  Follow up in 1 year, sooner with any worsening     Valdo Morales DNP, APRN

## 2022-04-21 ENCOUNTER — APPOINTMENT (OUTPATIENT)
Dept: CT IMAGING | Age: 42
End: 2022-04-21
Payer: COMMERCIAL

## 2022-04-21 ENCOUNTER — HOSPITAL ENCOUNTER (EMERGENCY)
Age: 42
Discharge: HOME | End: 2022-04-21
Payer: COMMERCIAL

## 2022-04-21 VITALS
WEIGHT: 202 LBS | RESPIRATION RATE: 16 BRPM | DIASTOLIC BLOOD PRESSURE: 65 MMHG | TEMPERATURE: 98.6 F | HEART RATE: 88 BPM | BODY MASS INDEX: 37.17 KG/M2 | OXYGEN SATURATION: 94 % | SYSTOLIC BLOOD PRESSURE: 109 MMHG | HEIGHT: 62 IN

## 2022-04-21 DIAGNOSIS — R42 DIZZINESS: Primary | ICD-10-CM

## 2022-04-21 LAB
ALBUMIN SERPL-MCNC: 4.7 G/DL (ref 3.5–5.2)
ALP BLD-CCNC: 72 U/L (ref 35–104)
ALT SERPL-CCNC: 18 U/L (ref 5–33)
ANION GAP SERPL CALCULATED.3IONS-SCNC: 11 MMOL/L (ref 7–19)
AST SERPL-CCNC: 21 U/L (ref 5–32)
BASOPHILS ABSOLUTE: 0 K/UL (ref 0–0.2)
BASOPHILS RELATIVE PERCENT: 0.5 % (ref 0–1)
BILIRUB SERPL-MCNC: <0.2 MG/DL (ref 0.2–1.2)
BUN BLDV-MCNC: 16 MG/DL (ref 6–20)
CALCIUM SERPL-MCNC: 9.7 MG/DL (ref 8.6–10)
CHLORIDE BLD-SCNC: 103 MMOL/L (ref 98–111)
CO2: 26 MMOL/L (ref 22–29)
CREAT SERPL-MCNC: 0.7 MG/DL (ref 0.5–0.9)
EKG P AXIS: 13 DEGREES
EKG P-R INTERVAL: 106 MS
EKG Q-T INTERVAL: 370 MS
EKG QRS DURATION: 88 MS
EKG QTC CALCULATION (BAZETT): 375 MS
EKG T AXIS: 32 DEGREES
EOSINOPHILS ABSOLUTE: 0.1 K/UL (ref 0–0.6)
EOSINOPHILS RELATIVE PERCENT: 1.5 % (ref 0–5)
GFR AFRICAN AMERICAN: >59
GFR NON-AFRICAN AMERICAN: >60
GLUCOSE BLD-MCNC: 98 MG/DL (ref 74–109)
HCT VFR BLD CALC: 45.6 % (ref 37–47)
HEMOGLOBIN: 14.5 G/DL (ref 12–16)
IMMATURE GRANULOCYTES #: 0 K/UL
LYMPHOCYTES ABSOLUTE: 1.7 K/UL (ref 1.1–4.5)
LYMPHOCYTES RELATIVE PERCENT: 19.9 % (ref 20–40)
MCH RBC QN AUTO: 28.2 PG (ref 27–31)
MCHC RBC AUTO-ENTMCNC: 31.8 G/DL (ref 33–37)
MCV RBC AUTO: 88.5 FL (ref 81–99)
MONOCYTES ABSOLUTE: 0.6 K/UL (ref 0–0.9)
MONOCYTES RELATIVE PERCENT: 6.8 % (ref 0–10)
NEUTROPHILS ABSOLUTE: 6.2 K/UL (ref 1.5–7.5)
NEUTROPHILS RELATIVE PERCENT: 71.1 % (ref 50–65)
PDW BLD-RTO: 12.8 % (ref 11.5–14.5)
PLATELET # BLD: 371 K/UL (ref 130–400)
PMV BLD AUTO: 10 FL (ref 9.4–12.3)
POTASSIUM REFLEX MAGNESIUM: 4.4 MMOL/L (ref 3.5–5)
RBC # BLD: 5.15 M/UL (ref 4.2–5.4)
SODIUM BLD-SCNC: 140 MMOL/L (ref 136–145)
TOTAL PROTEIN: 7.2 G/DL (ref 6.6–8.7)
TROPONIN: <0.01 NG/ML (ref 0–0.03)
WBC # BLD: 8.7 K/UL (ref 4.8–10.8)

## 2022-04-21 PROCEDURE — 93005 ELECTROCARDIOGRAM TRACING: CPT | Performed by: PHYSICIAN ASSISTANT

## 2022-04-21 PROCEDURE — 85025 COMPLETE CBC W/AUTO DIFF WBC: CPT

## 2022-04-21 PROCEDURE — 80053 COMPREHEN METABOLIC PANEL: CPT

## 2022-04-21 PROCEDURE — 6370000000 HC RX 637 (ALT 250 FOR IP): Performed by: PHYSICIAN ASSISTANT

## 2022-04-21 PROCEDURE — 70450 CT HEAD/BRAIN W/O DYE: CPT

## 2022-04-21 PROCEDURE — 36415 COLL VENOUS BLD VENIPUNCTURE: CPT

## 2022-04-21 PROCEDURE — 84484 ASSAY OF TROPONIN QUANT: CPT

## 2022-04-21 PROCEDURE — 99284 EMERGENCY DEPT VISIT MOD MDM: CPT

## 2022-04-21 RX ORDER — MECLIZINE HCL 12.5 MG/1
25 TABLET ORAL ONCE
Status: COMPLETED | OUTPATIENT
Start: 2022-04-21 | End: 2022-04-21

## 2022-04-21 RX ORDER — MECLIZINE HCL 12.5 MG/1
12.5 TABLET ORAL 3 TIMES DAILY PRN
Qty: 30 TABLET | Refills: 0 | Status: SHIPPED | OUTPATIENT
Start: 2022-04-21 | End: 2022-05-01

## 2022-04-21 RX ADMIN — MECLIZINE 25 MG: 12.5 TABLET ORAL at 09:58

## 2022-04-21 ASSESSMENT — ENCOUNTER SYMPTOMS
VOMITING: 0
ABDOMINAL PAIN: 0
COUGH: 0
SHORTNESS OF BREATH: 0
DIARRHEA: 0
NAUSEA: 0
PHOTOPHOBIA: 0

## 2022-04-21 NOTE — Clinical Note
Rober Oneil was seen and treated in our emergency department on 4/21/2022. She may return to work on 04/24/2022. If you have any questions or concerns, please don't hesitate to call.       Logan Barbour

## 2022-04-21 NOTE — ED PROVIDER NOTES
[facility] EMERGENCY DEPT  eMERGENCY dEPARTMENT eNCOUnter      Pt Name: Rubin Bautista  MRN: [id number]  [place] 1980  Date of evaluation: 4/21/2022  Provider: Danilo Loyd Dr       Chief Complaint   Patient presents with    Dizziness     started this morning around 0530 when waking. States is worse when changing positions. HISTORY OF PRESENT ILLNESS   (Location/Symptom, Timing/Onset,Context/Setting, Quality, Duration, Modifying Factors, Severity)  Note limiting factors. Rubin Bautista is a 40 y.o. female with history including anxiety, depression, hypertension, migraines, and status post hysterectomy who presents to the emergency department with complaint of dizziness. She states it was present upon awakening around 530 this morning. She is does state that it is worse with any movement or position change. She denies any associated headache or blurred vision. She states that the room feels like it spinning around her. She has no history of anything similar to this in the past.  She states that when she tries to get up and move around that she is running into objects. HPI    NursingNotes were reviewed. REVIEW OF SYSTEMS    (2-9 systems for level 4, 10 or more for level 5)     Review of Systems   Constitutional: Negative for chills and fever. HENT: Negative for congestion. Eyes: Negative for photophobia and visual disturbance. Respiratory: Negative for cough and shortness of breath. Cardiovascular: Negative for chest pain. Gastrointestinal: Negative for abdominal pain, diarrhea, nausea and vomiting. Musculoskeletal: Negative for neck pain and neck stiffness. Neurological: Positive for dizziness. Negative for weakness, numbness and headaches. All other systems reviewed and are negative.            PAST MEDICALHISTORY     Past Medical History:   Diagnosis Date    Anxiety     Depression     Hemiplegic migraine     Hypertension     Migraine SURGICAL HISTORY       Past Surgical History:   Procedure Laterality Date     SECTION      HYSTERECTOMY           CURRENT MEDICATIONS     Previous Medications    FENOFIBRATE (TRICOR) 145 MG TABLET    Take 145 mg by mouth 3 times daily    GALCANEZUMAB-GNLM (EMGALITY) 120 MG/ML SOAJ    Inject 120 mg into the skin every 30 days    METOPROLOL SUCCINATE (TOPROL XL) 50 MG EXTENDED RELEASE TABLET    Take 50 mg by mouth daily     OLANZAPINE (ZYPREXA) 2.5 MG TABLET    Take 1 tablet by mouth 2 times daily    RIMEGEPANT SULFATE (NURTEC) 75 MG TBDP    Take 1 tablet at the onset of migraine. Do not exceed 1 tablet in 24 hours. SERTRALINE (ZOLOFT) 50 MG TABLET    Take 50 mg by mouth nightly       ALLERGIES     Patient has no known allergies. FAMILY HISTORY       Family History   Problem Relation Age of Onset    Ataxia Mother     High Blood Pressure Mother     High Blood Pressure Father     COPD Father           SOCIAL HISTORY       Social History     Socioeconomic History    Marital status:      Spouse name: None    Number of children: None    Years of education: None    Highest education level: None   Occupational History    None   Tobacco Use    Smoking status: Never Smoker    Smokeless tobacco: Never Used   Vaping Use    Vaping Use: Never used   Substance and Sexual Activity    Alcohol use: Yes     Comment: occasional    Drug use: Yes     Types: Marijuana Glendy Healy     Comment: edibles     Sexual activity: Yes     Partners: Male   Other Topics Concern    None   Social History Narrative    None     Social Determinants of Health     Financial Resource Strain:     Difficulty of Paying Living Expenses: Not on file   Food Insecurity:     Worried About Running Out of Food in the Last Year: Not on file    Magdaleno of Food in the Last Year: Not on file   Transportation Needs:     Lack of Transportation (Medical): Not on file    Lack of Transportation (Non-Medical):  Not on file Physical Activity:     Days of Exercise per Week: Not on file    Minutes of Exercise per Session: Not on file   Stress:     Feeling of Stress : Not on file   Social Connections:     Frequency of Communication with Friends and Family: Not on file    Frequency of Social Gatherings with Friends and Family: Not on file    Attends Mu-ism Services: Not on file    Active Member of [address] [place] [address] or Organizations: Not on file    Attends Club or Organization Meetings: Not on file    Marital Status: Not on file   Intimate Partner Violence:     Fear of Current or Ex-Partner: Not on file    Emotionally Abused: Not on file    Physically Abused: Not on file    Sexually Abused: Not on file   Housing Stability:     Unable to Pay for Housing in the Last Year: Not on file    Number of [place] in the Last Year: Not on file    Unstable Housing in the Last Year: Not on file       SCREENINGS    Mcgregor Coma Scale  Eye Opening: Spontaneous  Best Verbal Response: Oriented  Best Motor Response: Obeys commands  Francesca Coma Scale Score: 15        PHYSICAL EXAM    (up to 7 for level 4, 8 or more for level 5)     ED Triage Vitals   BP Temp Temp Source Pulse Resp SpO2 Height Weight   04/21/22 0938 04/21/22 0945 04/21/22 0945 04/21/22 0938 04/21/22 0938 04/21/22 0938 04/21/22 0938 04/21/22 0938   134/69 98.6 °F (37 °C) Oral 78 20 98 % 5' 2\" (1.575 m) 202 lb (91.6 kg)       Physical Exam  Vitals and nursing note reviewed. Constitutional:       General: She is not in acute distress. Appearance: Normal appearance. She is normal weight. She is not ill-appearing or toxic-appearing. HENT:      Head: Normocephalic and atraumatic. Mouth/Throat:      Mouth: Mucous membranes are moist.   Eyes:      General:         Right eye: No discharge. Left eye: No discharge. Extraocular Movements: Extraocular movements intact.       Conjunctiva/sclera: Conjunctivae normal.      Pupils: Pupils are equal, round, and reactive to light.      Comments: Positive Jluis-Hallpike maneuver   Cardiovascular:      Rate and Rhythm: Normal rate and regular rhythm. Pulses: Normal pulses. Heart sounds: Normal heart sounds. Pulmonary:      Effort: Pulmonary effort is normal. No respiratory distress. Breath sounds: Normal breath sounds. Chest:      Chest wall: No tenderness. Abdominal:      Palpations: Abdomen is soft. Tenderness: There is no abdominal tenderness. Musculoskeletal:      Cervical back: Normal range of motion and neck supple. No rigidity or tenderness. Lymphadenopathy:      Cervical: No cervical adenopathy. Skin:     General: Skin is warm and dry. Neurological:      General: No focal deficit present. Mental Status: She is alert and oriented to person, place, and time. DIAGNOSTIC RESULTS     EKG: All EKG's areinterpreted by the Emergency Department Physician who either signs or Co-signs this chart in the absence of a cardiologist.    EKG interpreted by attending, normal sinus rhythm at a rate of 63, no STEMI or acute ischemia, borderline short WA interval at 116, QTc 376    Radiology:  CT HEAD WITHOUT CONTRAST  IMPRESSION:  1. No acute intracranial process. Signed by Dr Darryn Hawkins:  Labs Reviewed   CBC WITH AUTO DIFFERENTIAL - Abnormal; Notable for the following components:       Result Value    MCHC 31.8 (*)     Neutrophils % 71.1 (*)     Lymphocytes % 19.9 (*)     All other components within normal limits   COMPREHENSIVE METABOLIC PANEL W/ REFLEX TO MG FOR LOW K   TROPONIN       All other labs were within normal range or not returned as of this dictation.     EMERGENCY DEPARTMENT COURSE and DIFFERENTIAL DIAGNOSIS/MDM:   Vitals:    Vitals:    04/21/22 0938 04/21/22 0945 04/21/22 1136   BP: 134/69  109/65   Pulse: 78  88   Resp: 20  16   Temp:  98.6 °F (37 °C)    TempSrc:  Oral    SpO2: 98%  94%   Weight: 202 lb (91.6 kg)     Height: 5' 2\" (1.575 m)         MDM  Patient is a 69-year-old female presents the ER with complaint of dizziness that began upon awakening. On physical exam, she did have a positive Fulton-Hallpike concerning for vertigo. She was given a dose of meclizine in the ER. Her EKG shows normal sinus rhythm without STEMI or acute ischemia. She had a negative troponin. This has been going on for over 6 hours so I do not feel repeat troponins are warranted at this time. She also has no associated chest pain, shortness of breath, or other complaint. She has no electrolyte disturbance or acute kidney injury on her CMP. Her CMP does not show any anemia. The patient felt significantly improved in the ER after meclizine. However her dizziness had not fully resolved. She was offered CT scan. CT is negative for any acute intracranial process including stroke. I plan to treat his vertigo outpatient and encourage rest, fluids, meclizine, and follow-up with primary care. She is agreeable to this treatment plan. Return precautions were given to her and she verbalized understanding. All of her questions were answered. FINAL IMPRESSION      1.  Dizziness          DISPOSITION/PLAN   DISPOSITION Decision To Discharge 04/21/2022 12:24:42 PM      PATIENT REFERRED TO:  Kelly Souza DO  [address] [facility] [address] [facility] [id number] [phone]            DISCHARGE MEDICATIONS:  New Prescriptions    MECLIZINE (ANTIVERT) 12.5 MG TABLET    Take 1 tablet by mouth 3 times daily as needed for Dizziness          (Please note that portions of this note were completed with a voice recognition program.  Efforts were made to edit thedictations but occasionally words are mis-transcribed.)    RANGEL Patterson (electronically signed)     Dipesh Barrera [place]  04/21/22 [id number]

## 2022-06-12 ENCOUNTER — APPOINTMENT (OUTPATIENT)
Dept: GENERAL RADIOLOGY | Facility: HOSPITAL | Age: 42
End: 2022-06-12

## 2022-06-12 PROCEDURE — 71046 X-RAY EXAM CHEST 2 VIEWS: CPT

## 2022-06-12 PROCEDURE — 87635 SARS-COV-2 COVID-19 AMP PRB: CPT | Performed by: NURSE PRACTITIONER

## 2022-07-01 RX ORDER — RIMEGEPANT SULFATE 75 MG/75MG
TABLET, ORALLY DISINTEGRATING ORAL
Qty: 8 TABLET | Refills: 3 | Status: SHIPPED | OUTPATIENT
Start: 2022-07-01

## 2022-07-01 RX ORDER — GALCANEZUMAB 120 MG/ML
120 INJECTION, SOLUTION SUBCUTANEOUS
Qty: 1 PEN | Refills: 5 | Status: SHIPPED | OUTPATIENT
Start: 2022-07-01

## 2022-07-01 NOTE — TELEPHONE ENCOUNTER
Requested Prescriptions     Pending Prescriptions Disp Refills    Rimegepant Sulfate (NURTEC) 75 MG TBDP 8 tablet 3     Sig: Take 1 tablet at the onset of migraine. Do not exceed 1 tablet in 24 hours.     Galcanezumab-gnlm (EMGALITY) 120 MG/ML SOAJ 1 pen 5     Sig: Inject 120 mg into the skin every 30 days       Last Office Visit: 4/20/2022  Next Office Visit: 4/19/23  Last Medication Refill:   Nurtec 4/20/22 with 3 RF  Emgality 4/20/22 with 5 RF

## 2022-07-21 RX ORDER — GALCANEZUMAB 120 MG/ML
120 INJECTION, SOLUTION SUBCUTANEOUS
Qty: 1 PEN | Refills: 5 | OUTPATIENT
Start: 2022-07-21

## 2022-07-21 RX ORDER — RIMEGEPANT SULFATE 75 MG/75MG
TABLET, ORALLY DISINTEGRATING ORAL
Qty: 8 TABLET | Refills: 3 | OUTPATIENT
Start: 2022-07-21

## 2022-08-29 ENCOUNTER — TELEPHONE (OUTPATIENT)
Dept: NEUROLOGY | Age: 42
End: 2022-08-29

## 2022-08-29 RX ORDER — GALCANEZUMAB 120 MG/ML
120 INJECTION, SOLUTION SUBCUTANEOUS
Qty: 1 PEN | Refills: 5 | OUTPATIENT
Start: 2022-08-29

## 2022-11-06 ENCOUNTER — NURSE TRIAGE (OUTPATIENT)
Dept: CALL CENTER | Facility: HOSPITAL | Age: 42
End: 2022-11-06

## 2022-11-06 ENCOUNTER — APPOINTMENT (OUTPATIENT)
Dept: GENERAL RADIOLOGY | Facility: HOSPITAL | Age: 42
End: 2022-11-06

## 2022-11-06 ENCOUNTER — HOSPITAL ENCOUNTER (EMERGENCY)
Facility: HOSPITAL | Age: 42
Discharge: HOME | End: 2022-11-06
Admitting: EMERGENCY MEDICINE

## 2022-11-06 VITALS
OXYGEN SATURATION: 99 % | RESPIRATION RATE: 20 BRPM | TEMPERATURE: 98 F | DIASTOLIC BLOOD PRESSURE: 81 MMHG | HEART RATE: 89 BPM | WEIGHT: 184 LBS | SYSTOLIC BLOOD PRESSURE: 142 MMHG | BODY MASS INDEX: 33.86 KG/M2 | HEIGHT: 62 IN

## 2022-11-06 DIAGNOSIS — E87.6 HYPOKALEMIA: ICD-10-CM

## 2022-11-06 DIAGNOSIS — B34.9 VIRAL ILLNESS: Primary | ICD-10-CM

## 2022-11-06 LAB
ALBUMIN SERPL-MCNC: 4.6 G/DL (ref 3.5–5.2)
ALBUMIN/GLOB SERPL: 1.6 G/DL
ALP SERPL-CCNC: 56 U/L (ref 39–117)
ALT SERPL W P-5'-P-CCNC: 23 U/L (ref 1–33)
ANION GAP SERPL CALCULATED.3IONS-SCNC: 13 MMOL/L (ref 5–15)
AST SERPL-CCNC: 30 U/L (ref 1–32)
BASOPHILS # BLD AUTO: 0.06 10*3/MM3 (ref 0–0.2)
BASOPHILS NFR BLD AUTO: 0.8 % (ref 0–1.5)
BILIRUB SERPL-MCNC: 0.4 MG/DL (ref 0–1.2)
BILIRUB UR QL STRIP: NEGATIVE
BUN SERPL-MCNC: 8 MG/DL (ref 6–20)
BUN/CREAT SERPL: 9.4 (ref 7–25)
CALCIUM SPEC-SCNC: 10.3 MG/DL (ref 8.6–10.5)
CHLORIDE SERPL-SCNC: 101 MMOL/L (ref 98–107)
CLARITY UR: ABNORMAL
CO2 SERPL-SCNC: 25 MMOL/L (ref 22–29)
COLOR UR: ABNORMAL
CREAT SERPL-MCNC: 0.85 MG/DL (ref 0.57–1)
CRP SERPL-MCNC: 0.76 MG/DL (ref 0–0.5)
D DIMER PPP FEU-MCNC: 0.49 MCGFEU/ML (ref 0–0.5)
D-LACTATE SERPL-SCNC: 0.8 MMOL/L (ref 0.5–2)
DEPRECATED RDW RBC AUTO: 40.6 FL (ref 37–54)
EGFRCR SERPLBLD CKD-EPI 2021: 88.4 ML/MIN/1.73
EOSINOPHIL # BLD AUTO: 0.09 10*3/MM3 (ref 0–0.4)
EOSINOPHIL NFR BLD AUTO: 1.2 % (ref 0.3–6.2)
ERYTHROCYTE [DISTWIDTH] IN BLOOD BY AUTOMATED COUNT: 13 % (ref 12.3–15.4)
FLUAV RNA RESP QL NAA+PROBE: NOT DETECTED
FLUBV RNA RESP QL NAA+PROBE: NOT DETECTED
GLOBULIN UR ELPH-MCNC: 2.8 GM/DL
GLUCOSE SERPL-MCNC: 97 MG/DL (ref 65–99)
GLUCOSE UR STRIP-MCNC: NEGATIVE MG/DL
HCT VFR BLD AUTO: 42.8 % (ref 34–46.6)
HGB BLD-MCNC: 14 G/DL (ref 12–15.9)
HGB UR QL STRIP.AUTO: NEGATIVE
IMM GRANULOCYTES # BLD AUTO: 0.03 10*3/MM3 (ref 0–0.05)
IMM GRANULOCYTES NFR BLD AUTO: 0.4 % (ref 0–0.5)
KETONES UR QL STRIP: ABNORMAL
LEUKOCYTE ESTERASE UR QL STRIP.AUTO: NEGATIVE
LYMPHOCYTES # BLD AUTO: 1.86 10*3/MM3 (ref 0.7–3.1)
LYMPHOCYTES NFR BLD AUTO: 24.2 % (ref 19.6–45.3)
MCH RBC QN AUTO: 28.2 PG (ref 26.6–33)
MCHC RBC AUTO-ENTMCNC: 32.7 G/DL (ref 31.5–35.7)
MCV RBC AUTO: 86.3 FL (ref 79–97)
MONOCYTES # BLD AUTO: 0.65 10*3/MM3 (ref 0.1–0.9)
MONOCYTES NFR BLD AUTO: 8.5 % (ref 5–12)
NEUTROPHILS NFR BLD AUTO: 4.99 10*3/MM3 (ref 1.7–7)
NEUTROPHILS NFR BLD AUTO: 64.9 % (ref 42.7–76)
NITRITE UR QL STRIP: NEGATIVE
NRBC BLD AUTO-RTO: 0 /100 WBC (ref 0–0.2)
NT-PROBNP SERPL-MCNC: 78.1 PG/ML (ref 0–450)
PH UR STRIP.AUTO: 8.5 [PH] (ref 5–8)
PLATELET # BLD AUTO: 423 10*3/MM3 (ref 140–450)
PMV BLD AUTO: 10 FL (ref 6–12)
POTASSIUM SERPL-SCNC: 3.3 MMOL/L (ref 3.5–5.2)
PROT SERPL-MCNC: 7.4 G/DL (ref 6–8.5)
PROT UR QL STRIP: NEGATIVE
RBC # BLD AUTO: 4.96 10*6/MM3 (ref 3.77–5.28)
SARS-COV-2 RNA RESP QL NAA+PROBE: NOT DETECTED
SODIUM SERPL-SCNC: 139 MMOL/L (ref 136–145)
SP GR UR STRIP: 1.02 (ref 1–1.03)
TROPONIN T SERPL-MCNC: <0.01 NG/ML (ref 0–0.03)
UROBILINOGEN UR QL STRIP: ABNORMAL
WBC NRBC COR # BLD: 7.68 10*3/MM3 (ref 3.4–10.8)

## 2022-11-06 PROCEDURE — 83605 ASSAY OF LACTIC ACID: CPT | Performed by: NURSE PRACTITIONER

## 2022-11-06 PROCEDURE — 87636 SARSCOV2 & INF A&B AMP PRB: CPT | Performed by: EMERGENCY MEDICINE

## 2022-11-06 PROCEDURE — 25010000002 KETOROLAC TROMETHAMINE PER 15 MG: Performed by: NURSE PRACTITIONER

## 2022-11-06 PROCEDURE — 93005 ELECTROCARDIOGRAM TRACING: CPT | Performed by: NURSE PRACTITIONER

## 2022-11-06 PROCEDURE — 85379 FIBRIN DEGRADATION QUANT: CPT | Performed by: NURSE PRACTITIONER

## 2022-11-06 PROCEDURE — 83880 ASSAY OF NATRIURETIC PEPTIDE: CPT | Performed by: NURSE PRACTITIONER

## 2022-11-06 PROCEDURE — 85025 COMPLETE CBC W/AUTO DIFF WBC: CPT | Performed by: NURSE PRACTITIONER

## 2022-11-06 PROCEDURE — 93010 ELECTROCARDIOGRAM REPORT: CPT | Performed by: INTERNAL MEDICINE

## 2022-11-06 PROCEDURE — 80053 COMPREHEN METABOLIC PANEL: CPT | Performed by: NURSE PRACTITIONER

## 2022-11-06 PROCEDURE — 96374 THER/PROPH/DIAG INJ IV PUSH: CPT

## 2022-11-06 PROCEDURE — 81003 URINALYSIS AUTO W/O SCOPE: CPT | Performed by: NURSE PRACTITIONER

## 2022-11-06 PROCEDURE — 99284 EMERGENCY DEPT VISIT MOD MDM: CPT

## 2022-11-06 PROCEDURE — 86140 C-REACTIVE PROTEIN: CPT | Performed by: NURSE PRACTITIONER

## 2022-11-06 PROCEDURE — 84484 ASSAY OF TROPONIN QUANT: CPT | Performed by: NURSE PRACTITIONER

## 2022-11-06 PROCEDURE — 71045 X-RAY EXAM CHEST 1 VIEW: CPT

## 2022-11-06 RX ORDER — SODIUM CHLORIDE 0.9 % (FLUSH) 0.9 %
10 SYRINGE (ML) INJECTION AS NEEDED
Status: DISCONTINUED | OUTPATIENT
Start: 2022-11-06 | End: 2022-11-06 | Stop reason: HOSPADM

## 2022-11-06 RX ORDER — KETOROLAC TROMETHAMINE 15 MG/ML
15 INJECTION, SOLUTION INTRAMUSCULAR; INTRAVENOUS ONCE
Status: COMPLETED | OUTPATIENT
Start: 2022-11-06 | End: 2022-11-06

## 2022-11-06 RX ORDER — POTASSIUM CHLORIDE 750 MG/1
20 CAPSULE, EXTENDED RELEASE ORAL ONCE
Status: COMPLETED | OUTPATIENT
Start: 2022-11-06 | End: 2022-11-06

## 2022-11-06 RX ADMIN — POTASSIUM CHLORIDE 20 MEQ: 10 CAPSULE, COATED, EXTENDED RELEASE ORAL at 21:37

## 2022-11-06 RX ADMIN — SODIUM CHLORIDE, POTASSIUM CHLORIDE, SODIUM LACTATE AND CALCIUM CHLORIDE 1000 ML: 600; 310; 30; 20 INJECTION, SOLUTION INTRAVENOUS at 20:49

## 2022-11-06 RX ADMIN — KETOROLAC TROMETHAMINE 15 MG: 15 INJECTION, SOLUTION INTRAMUSCULAR; INTRAVENOUS at 21:01

## 2022-11-07 NOTE — ED NOTES
pt is requesting med for her HA, pt states she has HX of HA's, took tylenol today appx 1630, will update provider

## 2022-11-07 NOTE — ED PROVIDER NOTES
"Subjective   History of Present Illness  Patient is a pleasant 42-year-old female who presents here today with complaint of nausea vomiting fever body aches headache and cough.  Patient reports her symptoms began yesterday.  She has been around some recently ill contacts.  She is unsure what they had but states that it was some type of virus.  The patient states that \"I feel like I been run over by 15 trucks \".  She presents here today for further evaluation.  Denies any chest pain.  States that she does feel short of breath at times.  Denies any abdominal pain except when she coughs or vomits.     History provided by:  Patient   used: No        Review of Systems   Constitutional: Positive for chills, fatigue and fever.   Respiratory: Positive for cough and shortness of breath.    Gastrointestinal: Positive for nausea and vomiting.   Musculoskeletal: Positive for myalgias.   All other systems reviewed and are negative.      Past Medical History:   Diagnosis Date   • Depression    • Migraine    • Plantar fasciitis        No Known Allergies    Past Surgical History:   Procedure Laterality Date   •  SECTION      x2   • HYSTEROSCOPY         Family History   Problem Relation Age of Onset   • Ataxia Mother    • Hypertension Father    • COPD Father        Social History     Socioeconomic History   • Marital status:    Tobacco Use   • Smoking status: Never   • Smokeless tobacco: Never   Substance and Sexual Activity   • Alcohol use: No   • Drug use: No   • Sexual activity: Defer           Objective   Physical Exam  Vitals and nursing note reviewed.   Constitutional:       Appearance: Normal appearance.   HENT:      Head: Normocephalic and atraumatic.      Right Ear: External ear normal.      Left Ear: External ear normal.      Mouth/Throat:      Mouth: Mucous membranes are moist.      Pharynx: Oropharynx is clear.   Eyes:      Conjunctiva/sclera: Conjunctivae normal.      Pupils: Pupils " are equal, round, and reactive to light.   Cardiovascular:      Rate and Rhythm: Normal rate and regular rhythm.   Pulmonary:      Breath sounds: Normal breath sounds.   Abdominal:      General: Bowel sounds are normal.      Palpations: Abdomen is soft.      Tenderness: There is no abdominal tenderness.   Skin:     General: Skin is warm and dry.   Neurological:      General: No focal deficit present.      Mental Status: She is alert and oriented to person, place, and time.   Psychiatric:         Mood and Affect: Mood normal.         Behavior: Behavior normal.         Procedures           ED Course  ED Course as of 11/07/22 0138   Sun Nov 06, 2022 2126 Patient's labs show a normal CRP.  D-dimer negative.  Potassium was 3.3 and this was replaced with oral potassium.  Lactate normal.  Troponin negative.  D-dimer negative.  Flu and COVID swab negative.  White blood cell count normal.  Chest x-ray showed no acute findings.  EKG was unremarkable. [LF]   2132 Chest x-ray shows no acute findings.  At this time patient be discharged home in stable condition.  She has been around other ill contacts with similar symptoms.  She is advised likely is a viral illness, home and rest, push p.o. fluids.  Tylenol Motrin as needed for fever and pain control.  Advised to follow with her PCP tomorrow, return to the ER for any new or worsening symptoms. [LF]      ED Course User Index  [LF] Niki Campo, APRN                                 XR Chest 1 View   Final Result   No active disease is seen.           This report was finalized on 11/06/2022 20:34 by Dr. Jose Branham MD.        Labs Reviewed   COMPREHENSIVE METABOLIC PANEL - Abnormal; Notable for the following components:       Result Value    Potassium 3.3 (*)     All other components within normal limits    Narrative:     GFR Normal >60  Chronic Kidney Disease <60  Kidney Failure <15     URINALYSIS W/ CULTURE IF INDICATED - Abnormal; Notable for the following components:     Color, UA Dark Yellow (*)     Appearance, UA Turbid (*)     pH, UA 8.5 (*)     Ketones, UA 15 mg/dL (1+) (*)     All other components within normal limits    Narrative:     In absence of clinical symptoms, the presence of pyuria, bacteria, and/or nitrites on the urinalysis result does not correlate with infection.  Urine microscopic not indicated.   C-REACTIVE PROTEIN - Abnormal; Notable for the following components:    C-Reactive Protein 0.76 (*)     All other components within normal limits   COVID-19 AND FLU A/B, NP SWAB IN TRANSPORT MEDIA 8-12 HR TAT - Normal    Narrative:     Fact sheet for providers: https://www.fda.gov/media/205114/download    Fact sheet for patients: https://www.fda.gov/media/152139/download    Test performed by PCR.   TROPONIN (IN-HOUSE) - Normal    Narrative:     Troponin T Reference Range:  <= 0.03 ng/mL-   Negative for AMI  >0.03 ng/mL-     Abnormal for myocardial necrosis.  Clinicians would have to utilize clinical acumen, EKG, Troponin and serial changes to determine if it is an Acute Myocardial Infarction or myocardial injury due to an underlying chronic condition.       Results may be falsely decreased if patient taking Biotin.     D-DIMER, QUANTITATIVE - Normal    Narrative:     Reference Range is 0-0.50 MCGFEU/mL. However, results <0.50 MCGFEU/mL tends to rule out DVT or PE. Results >0.50 MCGFEU/mL are not useful in predicting absence or presence of DVT or PE.     BNP (IN-HOUSE) - Normal    Narrative:     Among patients with dyspnea, NT-proBNP is highly sensitive for the detection of acute congestive heart failure. In addition NT-proBNP of <300 pg/ml effectively rules out acute congestive heart failure with 99% negative predictive value.     LACTIC ACID, PLASMA - Normal   CBC WITH AUTO DIFFERENTIAL - Normal   CBC AND DIFFERENTIAL    Narrative:     The following orders were created for panel order CBC & Differential.  Procedure                               Abnormality          Status                     ---------                               -----------         ------                     CBC Auto Differential[[id number]]        Normal              Final result                 Please view results for these tests on the individual orders.               MDM  Number of Diagnoses or Management Options  Hypokalemia: new and requires workup  Viral illness: new and requires workup     Amount and/or Complexity of Data Reviewed  Clinical lab tests: ordered and reviewed  Tests in the radiology section of CPT®: ordered and reviewed  Tests in the medicine section of CPT®: ordered and reviewed    Patient Progress  Patient progress: stable      Final diagnoses:   Viral illness   Hypokalemia       ED Disposition  ED Disposition     ED Disposition   Discharge    Condition   Stable    Comment   --             Ramon Pabon, DO  [address] [place] [address] [phone]    Call in 1 day           Medication List      No changes were made to your prescriptions during this visit.          Niki Campo, APRN  11/07/22 0138

## 2022-11-07 NOTE — TELEPHONE ENCOUNTER
"Guidelines cassyed, Joana reviewed. Caller to go to ED due to SOA and no urine output in 6 hours.    Reason for Disposition  • [1] Difficulty breathing AND [2] not severe AND [3] not from stuffy nose (e.g., not relieved by cleaning out the nose)    Additional Information  • Negative: SEVERE difficulty breathing (e.g., struggling for each breath, speaks in single words)  • Negative: Difficult to awaken or acting confused (e.g., disoriented, slurred speech)  • Negative: Bluish (or gray) lips or face now  • Negative: Shock suspected (e.g., cold/pale/clammy skin, too weak to stand, low BP, rapid pulse)  • Negative: Sounds like a life-threatening emergency to the triager  • Negative: [1] Symptoms of COVID-19 (e.g., cough, fever, SOB, or others) AND [2] lives in an area with community spread  • Negative: [1] Sounds like a cold AND [2] no fever  • Negative: [1] Cough with sputum AND [2] no fever  • Negative: [1] Dry cough (no sputum) sputum AND [2] no fever  • Negative: [1] Throat pain AND [2] no fever  • Negative: Influenza vaccine reaction is suspected  • Negative: Chest pain  (Exception: MILD central chest pain, present only when coughing)  • Negative: [1] Headache AND [2] stiff neck (can't touch chin to chest)  • Negative: Fever > 104 F (40 C)    Answer Assessment - Initial Assessment Questions  1. WORST SYMPTOM: \"What is your worst symptom?\" (e.g., cough, runny nose, muscle aches, headache, sore throat, fever)      SOA and body aches, unable to hold down fluids  2. ONSET: \"When did your flu symptoms start?\"       Last night  3. COUGH: \"How bad is the cough?\"        Slight sough  4. RESPIRATORY DISTRESS: \"Describe your breathing.\"       Very SOA. Caller speak in short phrases  5. FEVER: \"Do you have a fever?\" If Yes, ask: \"What is your temperature, how was it measured, and when did it start?\"      States is low grade. No thermometer  6. EXPOSURE: \"Were you exposed to someone with influenza?\"        Unknown  7. FLU " "VACCINE: \"Did you get a flu shot this year?\"      Yes  8. HIGH RISK DISEASE: \"Do you any chronic medical problems?\" (e.g., heart or lung disease, asthma, weak immune system, or other HIGH RISK conditions)      no  9. PREGNANCY: \"Is there any chance you are pregnant?\" \"When was your last menstrual period?\"      n/a  10. OTHER SYMPTOMS: \"Do you have any other symptoms?\"  (e.g., runny nose, muscle aches, headache, sore throat)        Headache, also states mouth is dry and has not voided in over 6 hours    Protocols used: INFLUENZA - SEASONAL-ADULT-AH      "

## 2022-11-09 LAB
QT INTERVAL: 364 MS
QTC INTERVAL: 440 MS

## 2023-02-05 ENCOUNTER — HOSPITAL ENCOUNTER (INPATIENT)
Age: 43
LOS: 2 days | Discharge: HOME | DRG: 881 | End: 2023-02-07
Attending: EMERGENCY MEDICINE | Admitting: PSYCHIATRY & NEUROLOGY
Payer: COMMERCIAL

## 2023-02-05 DIAGNOSIS — T50.902A INTENTIONAL DRUG OVERDOSE, INITIAL ENCOUNTER (HCC): Primary | ICD-10-CM

## 2023-02-05 DIAGNOSIS — F32.A DEPRESSION, UNSPECIFIED DEPRESSION TYPE: ICD-10-CM

## 2023-02-05 PROBLEM — G47.00 INSOMNIA: Status: ACTIVE | Noted: 2023-02-05

## 2023-02-05 PROBLEM — T14.91XA SUICIDE ATTEMPT (HCC): Status: ACTIVE | Noted: 2023-02-05

## 2023-02-05 PROBLEM — F41.9 ANXIETY DISORDER: Status: ACTIVE | Noted: 2023-02-05

## 2023-02-05 LAB
ACETAMINOPHEN LEVEL: <5 UG/ML (ref 10–30)
ALBUMIN SERPL-MCNC: 4 G/DL (ref 3.5–5.2)
ALP BLD-CCNC: 61 U/L (ref 35–104)
ALT SERPL-CCNC: 22 U/L (ref 5–33)
AMPHETAMINE SCREEN, URINE: NEGATIVE
ANION GAP SERPL CALCULATED.3IONS-SCNC: 10 MMOL/L (ref 7–19)
AST SERPL-CCNC: 28 U/L (ref 5–32)
BACTERIA: NEGATIVE /HPF
BARBITURATE SCREEN URINE: NEGATIVE
BASOPHILS ABSOLUTE: 0.1 K/UL (ref 0–0.2)
BASOPHILS RELATIVE PERCENT: 0.8 % (ref 0–1)
BENZODIAZEPINE SCREEN, URINE: POSITIVE
BILIRUB SERPL-MCNC: <0.2 MG/DL (ref 0.2–1.2)
BILIRUBIN URINE: NEGATIVE
BLOOD, URINE: NEGATIVE
BUN BLDV-MCNC: 15 MG/DL (ref 6–20)
BUPRENORPHINE URINE: NEGATIVE
CALCIUM SERPL-MCNC: 10 MG/DL (ref 8.6–10)
CANNABINOID SCREEN URINE: POSITIVE
CHLORIDE BLD-SCNC: 99 MMOL/L (ref 98–111)
CLARITY: CLEAR
CO2: 27 MMOL/L (ref 22–29)
COCAINE METABOLITE SCREEN URINE: NEGATIVE
COLOR: YELLOW
CREAT SERPL-MCNC: 1 MG/DL (ref 0.5–0.9)
CRYSTALS, UA: ABNORMAL /HPF
EOSINOPHILS ABSOLUTE: 0.2 K/UL (ref 0–0.6)
EOSINOPHILS RELATIVE PERCENT: 1.8 % (ref 0–5)
EPITHELIAL CELLS, UA: 6 /HPF (ref 0–5)
ETHANOL: <10 MG/DL (ref 0–0.08)
GFR SERPL CREATININE-BSD FRML MDRD: >60 ML/MIN/{1.73_M2}
GLUCOSE BLD-MCNC: 103 MG/DL (ref 74–109)
GLUCOSE URINE: NEGATIVE MG/DL
HCT VFR BLD CALC: 41.3 % (ref 37–47)
HEMOGLOBIN: 13.6 G/DL (ref 12–16)
HYALINE CASTS: 5 /HPF (ref 0–8)
IMMATURE GRANULOCYTES #: 0 K/UL
KETONES, URINE: ABNORMAL MG/DL
LEUKOCYTE ESTERASE, URINE: ABNORMAL
LYMPHOCYTES ABSOLUTE: 2.5 K/UL (ref 1.1–4.5)
LYMPHOCYTES RELATIVE PERCENT: 28.4 % (ref 20–40)
Lab: ABNORMAL
MCH RBC QN AUTO: 29.2 PG (ref 27–31)
MCHC RBC AUTO-ENTMCNC: 32.9 G/DL (ref 33–37)
MCV RBC AUTO: 88.8 FL (ref 81–99)
METHADONE SCREEN, URINE: NEGATIVE
METHAMPHETAMINE, URINE: NEGATIVE
MONOCYTES ABSOLUTE: 0.9 K/UL (ref 0–0.9)
MONOCYTES RELATIVE PERCENT: 9.7 % (ref 0–10)
NEUTROPHILS ABSOLUTE: 5.2 K/UL (ref 1.5–7.5)
NEUTROPHILS RELATIVE PERCENT: 59.1 % (ref 50–65)
NITRITE, URINE: NEGATIVE
OPIATE SCREEN URINE: NEGATIVE
OXYCODONE URINE: NEGATIVE
PDW BLD-RTO: 12.3 % (ref 11.5–14.5)
PH UA: 6.5 (ref 5–8)
PHENCYCLIDINE SCREEN URINE: NEGATIVE
PLATELET # BLD: 405 K/UL (ref 130–400)
PMV BLD AUTO: 10.3 FL (ref 9.4–12.3)
POTASSIUM SERPL-SCNC: 3.1 MMOL/L (ref 3.5–5)
PROPOXYPHENE SCREEN: NEGATIVE
PROTEIN UA: NEGATIVE MG/DL
RBC # BLD: 4.65 M/UL (ref 4.2–5.4)
RBC UA: 2 /HPF (ref 0–4)
SALICYLATE, SERUM: <0.3 MG/DL (ref 3–10)
SARS-COV-2, NAAT: NOT DETECTED
SODIUM BLD-SCNC: 136 MMOL/L (ref 136–145)
SPECIFIC GRAVITY UA: 1.02 (ref 1–1.03)
TOTAL PROTEIN: 7.4 G/DL (ref 6.6–8.7)
TRICYCLIC, URINE: NEGATIVE
UROBILINOGEN, URINE: 1 E.U./DL
WBC # BLD: 8.7 K/UL (ref 4.8–10.8)
WBC UA: 5 /HPF (ref 0–5)

## 2023-02-05 PROCEDURE — 6370000000 HC RX 637 (ALT 250 FOR IP): Performed by: FAMILY MEDICINE

## 2023-02-05 PROCEDURE — 80143 DRUG ASSAY ACETAMINOPHEN: CPT

## 2023-02-05 PROCEDURE — 6370000000 HC RX 637 (ALT 250 FOR IP): Performed by: PSYCHIATRY & NEUROLOGY

## 2023-02-05 PROCEDURE — 80179 DRUG ASSAY SALICYLATE: CPT

## 2023-02-05 PROCEDURE — 99285 EMERGENCY DEPT VISIT HI MDM: CPT

## 2023-02-05 PROCEDURE — 80306 DRUG TEST PRSMV INSTRMNT: CPT

## 2023-02-05 PROCEDURE — 82077 ASSAY SPEC XCP UR&BREATH IA: CPT

## 2023-02-05 PROCEDURE — 1240000000 HC EMOTIONAL WELLNESS R&B

## 2023-02-05 PROCEDURE — 80053 COMPREHEN METABOLIC PANEL: CPT

## 2023-02-05 PROCEDURE — 81001 URINALYSIS AUTO W/SCOPE: CPT

## 2023-02-05 PROCEDURE — 36415 COLL VENOUS BLD VENIPUNCTURE: CPT

## 2023-02-05 PROCEDURE — 6370000000 HC RX 637 (ALT 250 FOR IP): Performed by: EMERGENCY MEDICINE

## 2023-02-05 PROCEDURE — 87635 SARS-COV-2 COVID-19 AMP PRB: CPT

## 2023-02-05 PROCEDURE — 85025 COMPLETE CBC W/AUTO DIFF WBC: CPT

## 2023-02-05 RX ORDER — FAMOTIDINE 20 MG/1
20 TABLET, FILM COATED ORAL 2 TIMES DAILY
Status: DISCONTINUED | OUTPATIENT
Start: 2023-02-05 | End: 2023-02-07 | Stop reason: HOSPADM

## 2023-02-05 RX ORDER — FAMOTIDINE 40 MG/1
40 TABLET, FILM COATED ORAL DAILY
COMMUNITY

## 2023-02-05 RX ORDER — FENOFIBRATE 160 MG/1
160 TABLET ORAL DAILY
Status: DISCONTINUED | OUTPATIENT
Start: 2023-02-05 | End: 2023-02-07 | Stop reason: HOSPADM

## 2023-02-05 RX ORDER — POTASSIUM CHLORIDE 20 MEQ/1
20 TABLET, EXTENDED RELEASE ORAL ONCE
Status: COMPLETED | OUTPATIENT
Start: 2023-02-05 | End: 2023-02-05

## 2023-02-05 RX ORDER — VENLAFAXINE HYDROCHLORIDE 75 MG/1
150 CAPSULE, EXTENDED RELEASE ORAL DAILY
Status: DISCONTINUED | OUTPATIENT
Start: 2023-02-05 | End: 2023-02-07 | Stop reason: HOSPADM

## 2023-02-05 RX ORDER — TRIAMTERENE AND HYDROCHLOROTHIAZIDE 37.5; 25 MG/1; MG/1
1 TABLET ORAL DAILY
COMMUNITY

## 2023-02-05 RX ORDER — HYDROXYZINE HYDROCHLORIDE 25 MG/1
25 TABLET, FILM COATED ORAL 3 TIMES DAILY PRN
Status: DISCONTINUED | OUTPATIENT
Start: 2023-02-05 | End: 2023-02-07 | Stop reason: HOSPADM

## 2023-02-05 RX ORDER — METOPROLOL SUCCINATE 50 MG/1
50 TABLET, EXTENDED RELEASE ORAL DAILY
Status: DISCONTINUED | OUTPATIENT
Start: 2023-02-05 | End: 2023-02-07 | Stop reason: HOSPADM

## 2023-02-05 RX ORDER — VENLAFAXINE HYDROCHLORIDE 150 MG/1
150 CAPSULE, EXTENDED RELEASE ORAL DAILY
Status: ON HOLD | COMMUNITY
End: 2023-02-07 | Stop reason: HOSPADM

## 2023-02-05 RX ORDER — ALPRAZOLAM 0.25 MG/1
0.25 TABLET ORAL 2 TIMES DAILY
Status: ON HOLD | COMMUNITY
End: 2023-02-07 | Stop reason: HOSPADM

## 2023-02-05 RX ORDER — TRIAMTERENE AND HYDROCHLOROTHIAZIDE 37.5; 25 MG/1; MG/1
1 TABLET ORAL DAILY
Status: DISCONTINUED | OUTPATIENT
Start: 2023-02-05 | End: 2023-02-07 | Stop reason: HOSPADM

## 2023-02-05 RX ORDER — METOPROLOL SUCCINATE 50 MG/1
50 TABLET, EXTENDED RELEASE ORAL DAILY
Status: DISCONTINUED | OUTPATIENT
Start: 2023-02-05 | End: 2023-02-05

## 2023-02-05 RX ORDER — TRAZODONE HYDROCHLORIDE 50 MG/1
50 TABLET ORAL NIGHTLY
Status: DISCONTINUED | OUTPATIENT
Start: 2023-02-05 | End: 2023-02-07 | Stop reason: HOSPADM

## 2023-02-05 RX ORDER — CLONAZEPAM 0.25 MG/1
0.25 TABLET, ORALLY DISINTEGRATING ORAL DAILY PRN
Status: DISCONTINUED | OUTPATIENT
Start: 2023-02-05 | End: 2023-02-07 | Stop reason: HOSPADM

## 2023-02-05 RX ORDER — IBUPROFEN 200 MG
TABLET ORAL 4 TIMES DAILY PRN
Status: DISCONTINUED | OUTPATIENT
Start: 2023-02-05 | End: 2023-02-07 | Stop reason: HOSPADM

## 2023-02-05 RX ORDER — POLYETHYLENE GLYCOL 3350 17 G/17G
17 POWDER, FOR SOLUTION ORAL DAILY PRN
Status: DISCONTINUED | OUTPATIENT
Start: 2023-02-05 | End: 2023-02-07 | Stop reason: HOSPADM

## 2023-02-05 RX ORDER — ACETAMINOPHEN 325 MG/1
650 TABLET ORAL EVERY 4 HOURS PRN
Status: DISCONTINUED | OUTPATIENT
Start: 2023-02-05 | End: 2023-02-07 | Stop reason: HOSPADM

## 2023-02-05 RX ORDER — FAMOTIDINE 20 MG/1
40 TABLET, FILM COATED ORAL DAILY
Status: DISCONTINUED | OUTPATIENT
Start: 2023-02-05 | End: 2023-02-05

## 2023-02-05 RX ADMIN — TRIAMTERENE AND HYDROCHLOROTHIAZIDE 1 TABLET: 37.5; 25 TABLET ORAL at 16:58

## 2023-02-05 RX ADMIN — FAMOTIDINE 20 MG: 20 TABLET ORAL at 16:58

## 2023-02-05 RX ADMIN — VENLAFAXINE HYDROCHLORIDE 150 MG: 75 CAPSULE, EXTENDED RELEASE ORAL at 16:58

## 2023-02-05 RX ADMIN — METOPROLOL SUCCINATE 50 MG: 50 TABLET, EXTENDED RELEASE ORAL at 16:58

## 2023-02-05 RX ADMIN — BACITRACIN ZINC NEOMYCIN SULFATE POLYMYXIN B SULFATE: 400; 3.5; 5 OINTMENT TOPICAL at 16:58

## 2023-02-05 RX ADMIN — POTASSIUM CHLORIDE 20 MEQ: 1500 TABLET, EXTENDED RELEASE ORAL at 06:35

## 2023-02-05 RX ADMIN — FENOFIBRATE 160 MG: 160 TABLET ORAL at 17:03

## 2023-02-05 RX ADMIN — TRAZODONE HYDROCHLORIDE 50 MG: 50 TABLET ORAL at 20:42

## 2023-02-05 ASSESSMENT — SLEEP AND FATIGUE QUESTIONNAIRES
SLEEP PATTERN: DIFFICULTY FALLING ASLEEP;DISTURBED/INTERRUPTED SLEEP;INSOMNIA;RESTLESSNESS
DO YOU HAVE DIFFICULTY SLEEPING: YES
DO YOU USE A SLEEP AID: YES
AVERAGE NUMBER OF SLEEP HOURS: 5

## 2023-02-05 ASSESSMENT — PATIENT HEALTH QUESTIONNAIRE - PHQ9: SUM OF ALL RESPONSES TO PHQ QUESTIONS 1-9: 7

## 2023-02-05 ASSESSMENT — LIFESTYLE VARIABLES
HOW MANY STANDARD DRINKS CONTAINING ALCOHOL DO YOU HAVE ON A TYPICAL DAY: 3 OR 4
HOW OFTEN DO YOU HAVE A DRINK CONTAINING ALCOHOL: 2-3 TIMES A WEEK

## 2023-02-05 ASSESSMENT — ENCOUNTER SYMPTOMS
ABDOMINAL PAIN: 0
VOMITING: 0
SHORTNESS OF BREATH: 0

## 2023-02-05 ASSESSMENT — PAIN - FUNCTIONAL ASSESSMENT: PAIN_FUNCTIONAL_ASSESSMENT: NONE - DENIES PAIN

## 2023-02-05 NOTE — PROGRESS NOTES
Pt is unable to complete the psychosocial/CSSRS assessments or the OQ Quetionnaire due to being extremely emotional since arriving on the unit today.

## 2023-02-05 NOTE — ED NOTES
Report called to [facility] on 6th floor      Davida, [address] [facility]  02/05/23 7269

## 2023-02-05 NOTE — ED NOTES
Patient medication box enclosed in patient belongings bag and taped shut for privacy. Box of medications placed in belongings with security. Patient placed in maroon scrubs, sitter at bedside.        Jordyn Panda RN  02/05/23 7554

## 2023-02-05 NOTE — ED NOTES
Mona Chu from poison control center updated at this time via phone     Maryann Smith, [address] [facility]  02/05/23 1384

## 2023-02-05 NOTE — ED PROVIDER NOTES
[facility] EMERGENCY DEPT  eMERGENCY dEPARTMENT eNCOUnter      Pt Name: Jose Colon  MRN: [id number]  [place] 1980  Date of evaluation: 2/5/2023  Provider: Yusef Griffith MD    CHIEF COMPLAINT       Chief Complaint   Patient presents with    Drug Overdose     Patient arrive by Chrissy Varma from a local hotel. Per EMS patient has been in a disagreement with her . Patient took quantity five 0.25 mg Xanax during the evening and text her  a goodbye text. Patient was given 2 mg Narcan by EMS since patient was basically unresponsive upon their arrival.             HISTORY OF PRESENT ILLNESS   (Location/Symptom, Timing/Onset,Context/Setting, Quality, Duration, Modifying Factors, Severity)  Note limiting factors. Jose Colon is a 44 y.o. female who presents to the emergency department for evaluation regarding overdose. Patient reports that she had been in a verbal argument with her  and had left the home and was at a local hotel. States that she began to text her  and then sent him a Lauryn text\" patient states she then went and took 4 additional 0.25 Xanax tablets at around 11:30 PM.  Patient's  notified their daughter who knew which hotel she was staying at he then called EMS. Patient was given 2 mg of Narcan by EMS as she was altered upon their arrival.  Upon arrival to the ED patient is alert, speaking and answering questions appropriately. Patient states that she was just upset and has been feeling depressed. No recent inpatient psychiatric hospitalization. HPI    NursingNotes were reviewed. REVIEW OF SYSTEMS    (2-9 systems for level 4, 10 or more for level 5)     Review of Systems   Constitutional:  Negative for chills and fever. Respiratory:  Negative for shortness of breath. Cardiovascular:  Negative for chest pain. Gastrointestinal:  Negative for abdominal pain and vomiting. Psychiatric/Behavioral:  Positive for suicidal ideas.  The patient is nervous/anxious. All other systems reviewed and are negative. PAST MEDICALHISTORY     Past Medical History:   Diagnosis Date    Anxiety     Depression     Hemiplegic migraine     Hypertension     Migraine          SURGICAL HISTORY       Past Surgical History:   Procedure Laterality Date     SECTION      HYSTERECTOMY (CERVIX STATUS UNKNOWN)           CURRENT MEDICATIONS     Previous Medications    ALPRAZOLAM (XANAX) 0.25 MG TABLET    Take 0.25 mg by mouth 2 times daily. FAMOTIDINE (PEPCID) 40 MG TABLET    Take 40 mg by mouth daily    FENOFIBRATE (TRICOR) 145 MG TABLET    Take 145 mg by mouth 3 times daily    GALCANEZUMAB-GNLM (EMGALITY) 120 MG/ML SOAJ    Inject 120 mg into the skin every 30 days    METOPROLOL SUCCINATE (TOPROL XL) 50 MG EXTENDED RELEASE TABLET    Take 50 mg by mouth daily     MULTIPLE VITAMINS-MINERALS (MULTIVITAMIN ADULTS PO)    Take by mouth daily    RIMEGEPANT SULFATE (NURTEC) 75 MG TBDP    Take 1 tablet at the onset of migraine. Do not exceed 1 tablet in 24 hours. SPECIALTY VITAMINS PRODUCTS (BIOTIN PLUS KERATIN PO)    Take by mouth daily    TRIAMTERENE-HYDROCHLOROTHIAZIDE (MAXZIDE-25) 37.5-25 MG PER TABLET    Take 1 tablet by mouth daily    VENLAFAXINE (EFFEXOR XR) 150 MG EXTENDED RELEASE CAPSULE    Take 150 mg by mouth daily       ALLERGIES     Patient has no known allergies.     FAMILY HISTORY       Family History   Problem Relation Age of Onset    Ataxia Mother     High Blood Pressure Mother     High Blood Pressure Father     COPD Father           SOCIAL HISTORY       Social History     Socioeconomic History    Marital status:      Spouse name: None    Number of children: None    Years of education: None    Highest education level: None   Tobacco Use    Smoking status: Never    Smokeless tobacco: Never   Vaping Use    Vaping Use: Never used   Substance and Sexual Activity    Alcohol use: Yes     Comment: occasional    Drug use: Yes     Types: Marijuana Annalise Dugan) Comment: edibles     Sexual activity: Yes     Partners: Male       SCREENINGS    Francesca Coma Scale  Eye Opening: Spontaneous  Best Verbal Response: Oriented  Best Motor Response: Obeys commands  Francesca Coma Scale Score: 15        PHYSICAL EXAM    (up to 7 for level 4, 8 or more for level 5)     ED Triage Vitals [02/05/23 0102]   BP Temp Temp Source Heart Rate Resp SpO2 Height Weight   (!) 140/96 98.9 °F (37.2 °C) Oral (!) 113 20 100 % 5' 2\" (1.575 m) 170 lb (77.1 kg)       Physical Exam  Vitals and nursing note reviewed. HENT:      Head: Atraumatic. Mouth/Throat:      Mouth: Mucous membranes are not dry. Eyes:      General: No scleral icterus. Pupils: Pupils are equal, round, and reactive to light. Neck:      Trachea: No tracheal deviation. Cardiovascular:      Rate and Rhythm: Normal rate and regular rhythm. Heart sounds: Normal heart sounds. No murmur heard. Pulmonary:      Effort: Pulmonary effort is normal. No respiratory distress. Breath sounds: Normal breath sounds. No stridor. Abdominal:      General: There is no distension. Palpations: Abdomen is soft. Tenderness: There is no abdominal tenderness. There is no guarding. Skin:     Coloration: Skin is not pale. Findings: No rash. Neurological:      General: No focal deficit present. Mental Status: She is alert and oriented to person, place, and time. Psychiatric:         Mood and Affect: Mood is depressed. Behavior: Behavior is cooperative. DIAGNOSTIC RESULTS     EKG: All EKG's areinterpreted by the Emergency Department Physician who either signs or Co-signs this chart in the absence of a cardiologist.    Julia Coad: Normal sinus rhythm at a rate of 89, there is no evidence of acute ST elevation identified. QRS duration is 91, QTc: 429 MS.     LABS:  Labs Reviewed   CBC WITH AUTO DIFFERENTIAL - Abnormal; Notable for the following components:       Result Value    MCHC 32.9 (*)     Platelets 725 (*)     All other components within normal limits   COMPREHENSIVE METABOLIC PANEL - Abnormal; Notable for the following components:    Potassium 3.1 (*)     Creatinine 1.0 (*)     All other components within normal limits   DRUG SCRN, BUPRENORPHINE - Abnormal; Notable for the following components:    Benzodiazepine Screen, Urine POSITIVE (*)     Cannabinoid Scrn, Ur POSITIVE (*)     All other components within normal limits   URINALYSIS WITH REFLEX TO CULTURE - Abnormal; Notable for the following components:    Ketones, Urine TRACE (*)     Leukocyte Esterase, Urine TRACE (*)     All other components within normal limits   ACETAMINOPHEN LEVEL - Abnormal; Notable for the following components:    Acetaminophen Level <5 (*)     All other components within normal limits   SALICYLATE LEVEL - Abnormal; Notable for the following components:    Salicylate, Serum <8.7 (*)     All other components within normal limits   MICROSCOPIC URINALYSIS - Abnormal; Notable for the following components:    Bacteria, UA NEGATIVE (*)     Crystals, UA NEG (*)     All other components within normal limits   COVID-19, RAPID   ETHANOL       All other labs were within normal range or not returned as of this dictation. EMERGENCY DEPARTMENT COURSE and DIFFERENTIAL DIAGNOSIS/MDM:   Vitals:    Vitals:    02/05/23 0102 02/05/23 0504   BP: (!) 140/96 (!) 140/86   Pulse: (!) 113 83   Resp: 20 18   Temp: 98.9 °F (37.2 °C)    TempSrc: Oral    SpO2: 100% 94%   Weight: 170 lb (77.1 kg)    Height: 5' 2\" (1.575 m)        MDM     Amount and/or Complexity of Data Reviewed  Clinical lab tests: ordered and reviewed  Obtain history from someone other than the patient: yes  Discuss the patient with other providers: yes  Independent visualization of images, tracings, or specimens: yes    I have independently reviewed patient's laboratory studies performed while she is present here in the emergency department.   Urine drug screen noted be positive for benzodiazepines, marijuana. Her urinalysis does not reveal evidence of urinary tract infection. WBC count is normal at 8.7. Her serum electrolytes look okay with a normal serum creatinine of 1.0. Her potassium is slightly low at 3.1. She has received a dose of oral potassium. Secure environment with a one-to-one sitter. Consultation from the [address] [place] [address] was obtained who recommended a 4 to 6-hour period of observation postingestion. At that point patient will be cleared to undergo psychiatric evaluation. 0500: Patient has been observed here in the ED with stable vital signs. She has not had any further alteration of consciousness. Her serum salicylate and Tylenol were negative. CONSULTS:  Patient's been seen and evaluated by mental professional and dietary, Dr. Steve Sosa, patient has been accepted for inpatient admission to the [facility]. PROCEDURES:  Unless otherwise noted below, none     Procedures    FINAL IMPRESSION      1. Intentional drug overdose, initial encounter ([facility]    2.  Depression, unspecified depression type          DISPOSITION/PLAN   DISPOSITION Decision To Admit 02/05/2023 06:52:59 AM      (Please note that portions of this note were completed with a voice recognition program.  Efforts were made to edit thedictations but occasionally words are mis-transcribed.)    Wing Li MD (electronically signed)  Attending Emergency Physician          Wing Li MD  02/05/23 2845

## 2023-02-05 NOTE — PROGRESS NOTES
Admission Note      Reason for admission/Target Symptom: Per nursing admission assessment -  Pt is a 44 y.o. female who presents to the emergency department for evaluation regarding overdose. Patient reports that she had been in a verbal argument with her  and had left the home and was at a local hotel. States that she began to text her  and then sent him a Lauryn text\" patient states she then went and took 4 additional 0.25 Xanax tablets at around 11:30 PM.  Patient's  notified their daughter who knew which hotel she was staying at he then called EMS. Patient was given 2 mg of Narcan by EMS as she was altered upon their arrival.  Upon arrival to the ED patient is alert, speaking and answering questions appropriately. Patient states that she was just upset and has been feeling depressed. No recent inpatient psychiatric hospitalization. Diagnoses: Intentional Drug Overdose, initial encounter; Depression, unspecified type     UDS: Positive for Benzodiazepines & Cannabis  BAL: Negative <10      SW will meet with treatment team to discuss patient's treatment including care planning, discharge planning, and follow-up needs. Patient has been admitted to [facility]. Treatment team will identify the patient's discharge needs. Appointments will be made for medication management and outpatient therapy/counseling. Pt confirmed the need for ongoing treatment post inpatient stay. Pt was also provided a handout of contact information for drug and alcohol treatment centers and other community support service such as [facility], AA, and [facility].

## 2023-02-05 NOTE — H&P
HISTORY and PHYSICAL      CHIEF COMPLAINT:  SA    Reason for Admission:      History Obtained From:  patient, chart    HISTORY OF PRESENT ILLNESS:      The patient is a 44 y.o. female who is admitted to the [place] [address] unit with worsening mood issues. She has no c/o CP or SOA. She has no abdominal pain or N/V. She has no new pain issues. No fevers. No HA or dizziness. Past Medical History:        Diagnosis Date    Anxiety     Depression     Hemiplegic migraine     Hypertension     Migraine      Past Surgical History:        Procedure Laterality Date     SECTION      HYSTERECTOMY (CERVIX STATUS UNKNOWN)           Medications Prior to Admission:    Medications Prior to Admission: ALPRAZolam (XANAX) 0.25 MG tablet, Take 0.25 mg by mouth 2 times daily. venlafaxine (EFFEXOR XR) 150 MG extended release capsule, Take 150 mg by mouth daily  famotidine (PEPCID) 40 MG tablet, Take 40 mg by mouth daily  triamterene-hydroCHLOROthiazide (MAXZIDE-25) 37.5-25 MG per tablet, Take 1 tablet by mouth daily  Specialty Vitamins Products (BIOTIN PLUS KERATIN PO), Take by mouth daily  Multiple Vitamins-Minerals (MULTIVITAMIN ADULTS PO), Take by mouth daily  Rimegepant Sulfate (NURTEC) 75 MG TBDP, Take 1 tablet at the onset of migraine. Do not exceed 1 tablet in 24 hours. Galcanezumab-gnlm (EMGALITY) 120 MG/ML SOAJ, Inject 120 mg into the skin every 30 days  fenofibrate (TRICOR) 145 MG tablet, Take 145 mg by mouth 3 times daily  metoprolol succinate (TOPROL XL) 50 MG extended release tablet, Take 50 mg by mouth daily     Allergies:  Patient has no known allergies. Social History:   TOBACCO:   reports that she has never smoked. She has never used smokeless tobacco.  ETOH:   reports current alcohol use. DRUGS:   reports current drug use. Drug: Marijuana Juliane Nogueira).   MARITAL STATUS:    OCCUPATION:  she is working  Patient currently lives with family       Family History:       Problem Relation Age of Onset    Ataxia Mother     High Blood Pressure Mother     High Blood Pressure Father     COPD Father      REVIEW OF SYSTEMS:  Constitutional: neg  CV: neg  Pulmonary: neg  GI: neg  : neg  Psych: SA  Neuro: neg  Skin: neg  MusculoSkeletal: neg  HEENT: neg  Joints: neg    Vitals:  /75   Pulse 96   Temp 97.3 °F (36.3 °C)   Resp 18   Ht 5' 2\" (1.575 m)   Wt 170 lb (77.1 kg)   SpO2 100%   BMI 31.09 kg/m²     PHYSICAL EXAM:  Gen: NAD, alert  HEENT: WNL  Lymph: no LAD  Neck: no JVD or masses  Chest: CTA bilat  CV: RRR  Abdomen: NT/ND  Extrem: no C/C/E  Neuro: non focal  Skin: no rashes  Joints: no redness    DATA:  I have reviewed the admission labs and imaging tests.     ASSESSMENT AND PLAN:      Principal Problem:    Depression, SA---follow with Psych    HTN--monitor BP    THC Use    Hypokalemia--recheck in am        Miranda Carey MD  11:53 AM 2/5/2023

## 2023-02-05 NOTE — PROGRESS NOTES
[facility] Admission Note  Nursing Admission Note        Reason for Admission: Patient reports that she had been in a verbal argument with her  and had left the home and was at a local hotel. States that she began to text her  and then sent him a Lauryn text\" patient states she then went and took 4 additional 0.25 Xanax tablets at around 11:30 PM.  Patient's  notified their daughter who knew which hotel she was staying at he then called EMS. Patient Active Problem List   Diagnosis    Chronic migraine    Depression, unspecified depression type         Addictive Behavior:   Addictive Behavior  In the Past 3 Months, Have You Felt or Has Someone Told You That You Have a Problem With  : Internet use    Medical Problems:   Past Medical History:   Diagnosis Date    Anxiety     Depression     Hemiplegic migraine     Hypertension     Migraine        Status EXAM:  Mental Status and Behavioral Exam  Normal: No  Level of Assistance: Independent/Self  Facial Expression: Sad, Worried  Affect: Blunt, Constricted, Unstable  Level of Consciousness: Alert  Frequency of Checks: 4 times per hour, close  Mood:Normal: No  Mood: Depressed, Anxious, Despair, Helpless, Sad  Motor Activity:Normal: Yes  Eye Contact: Good  Observed Behavior: Impulsive, Cooperative, Friendly, Preoccupied, Tearful  Sexual Misconduct History: Current - no  Preception: Clayton to person, Clayton to time, Clayton to place, Clayton to situation  Attention:Normal: No  Attention: Distractible  Thought Processes: Circumstantial  Thought Content:Normal: No  Thought Content: Preoccupations  Depression Symptoms: Change in energy level, Crying, Feelings of helplessness, Feelings of hopelessess, Feelings of worthlessness, Impaired concentration, Isolative, Loss of interest  Anxiety Symptoms: Generalized, Unexplained fears  Rose Symptoms: No problems reported or observed.   Hallucinations: None  Delusions: No  Memory:Normal: Yes  Insight and Judgment: No  Insight and Judgment: Poor judgment, Poor insight    Psych:   Suicidal Ideation: no.  If yes, is there a plan? (Describe)                Risk of Suicide: High Risk   Homicidal Ideation:  no.  If yes, describe: Auditory/Visual Hallucinations:  no.      If yes, describe AVH? *    Metabolic Screening:  No results found for: LABA1C  No results found for: CHOL  No results found for: TRIG  No results found for: HDL  No components found for: LDLCAL  No results found for: LABVLDL  Body mass index is 31.09 kg/m². BP Readings from Last 2 Encounters:   02/05/23 122/75   04/21/22 109/65       PATIENT STRENGTHS and Barriers:   Patient Strengths/Barriers  Strengths (Must Choose Two): Adequate financial resources, Education, Independent living, Motivation level for treatment, Sense of humor, Stable housing, Support from family, Support from friends, Support from organized community  Barriers: Recreational/leisure/hobbies      Tobacco Screening:  Practical Counseling, on admission, elvie X, if applicable and completed (first 3 are required if patient doesn't refuse):            Recognizing danger situations (included triggers and roadblocks)   DOES NOT SMOKE              Coping skills (new ways to manage stress, exercise, relaxation techniques, changing routine, distraction  DOES NOT SMOKE                                                             Basic information about quitting (benefits of quitting, techniques in how to quit, available resources DOES NOT SMOKE           Referral for counseling faxed to [address] [place] [address] Way                                                Patient refused counseling DOES NOT SMOKE           Patient has not smoked in the last 30 days DOES NOT SMOKE           Patient offered nicotine patch.   Received DOES NOT SMOKE           Refused DOES NOT SMOKE           Patient is a non-smoker DOES NOT SMOKE                Admission to Unit:    Pt admitted to [facility] under the care of Dr. Johnnye Meckel,  arrived on unit via [facility] with security and staff from ER    Patient arrived dressed in paper scrubs:  yes. Body assessment and safety check completed by Noe Bo RN and Ez Bynum RN no contraband discovered. Patient belongings and valuables was cataloged and accounted for by Dioni Gates. Admission completed by Hakeem Damico to unit, unit policy and expectations:  yes    Reviewed and explained all legal documents:  yes    Education for Fall Prevention and Restraints given: yes    Patient signed all legal documents yes   Pt verbalizes understanding:yes     Miroslava Mahmood Obtained? yes    Medical Bed:  Does patient require a medical bed? no  If answered yes for medical bed use, does the patient have the following conditions? High risk for falls? no   Obstructive sleep apnea? no   Oxygen Use? no   Use of assistive devices? no   Other, (explain)? Identifies stressors. yes   Taking care of sick parents, caring for household. Taking care of best friend that  in  from ETOH abuse. Protective Factors:  Patient identifies protective factors with nursing staff as follows: Identifies reasons for living: Yes   Supportive Social Network or family: Yes    Belief that suicide is immoral/high spirituality: Yes   Responsibility to family or others/living with family: Yes   Fear of death or dying due to pain and suffering: No   Engaged in work or school: Yes     If Patient is unable to identify, reason why? Admission Note:    Pt states that she has been dealing with emotions from when her best friend  in  and she has never possessed those feelings. 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 She also is helping with her aging father. And also her own household. At this time she denies SI, HI and AVH. She has been cooperative with staff and is acclimating to the unit.           Electronically signed by Myron Kilgore RN on 23 at 1:43 PM CST
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
## 2023-02-05 NOTE — ED NOTES
Contacted Poison Control and spoke with Deepak Acosta. Alanna Chaudhry patient took quantity 5, Xanax 0.25. She stated supportive care 4-6 hours.         Dr. Jayesh Garsia updated       Arben Ochoa, JOSR  02/05/23 0532

## 2023-02-05 NOTE — PROGRESS NOTES
Behavioral Services  Medicare Certification Upon Admission    I certify that this patient's inpatient psychiatric hospital admission is medically necessary for:    [x] (1) Treatment which could reasonably be expected to improve this patient's condition,       [x] (2) Or for diagnostic study;     AND     [x](2) The inpatient psychiatric services are provided while the individual is under the care of a physician and are included in the individualized plan of care.     Estimated length of stay/service 3-5 days    Plan for post-hospital care TBA    Electronically signed by Jorge Guzman MD on 2/5/2023 at 1:00 PM

## 2023-02-06 LAB
ANION GAP SERPL CALCULATED.3IONS-SCNC: 11 MMOL/L (ref 7–19)
BUN BLDV-MCNC: 18 MG/DL (ref 6–20)
CALCIUM SERPL-MCNC: 9.7 MG/DL (ref 8.6–10)
CHLORIDE BLD-SCNC: 102 MMOL/L (ref 98–111)
CO2: 25 MMOL/L (ref 22–29)
CREAT SERPL-MCNC: 1 MG/DL (ref 0.5–0.9)
GFR SERPL CREATININE-BSD FRML MDRD: >60 ML/MIN/{1.73_M2}
GLUCOSE BLD-MCNC: 83 MG/DL (ref 74–109)
MAGNESIUM: 1.8 MG/DL (ref 1.6–2.6)
POTASSIUM SERPL-SCNC: 3.9 MMOL/L (ref 3.5–5)
SODIUM BLD-SCNC: 138 MMOL/L (ref 136–145)
TSH REFLEX FT4: 1.02 UIU/ML (ref 0.35–5.5)
VITAMIN B-12: 268 PG/ML (ref 211–946)
VITAMIN D 25-HYDROXY: 18 NG/ML

## 2023-02-06 PROCEDURE — 6370000000 HC RX 637 (ALT 250 FOR IP): Performed by: FAMILY MEDICINE

## 2023-02-06 PROCEDURE — 83735 ASSAY OF MAGNESIUM: CPT

## 2023-02-06 PROCEDURE — 84443 ASSAY THYROID STIM HORMONE: CPT

## 2023-02-06 PROCEDURE — 82607 VITAMIN B-12: CPT

## 2023-02-06 PROCEDURE — 80048 BASIC METABOLIC PNL TOTAL CA: CPT

## 2023-02-06 PROCEDURE — 1240000000 HC EMOTIONAL WELLNESS R&B

## 2023-02-06 PROCEDURE — 6370000000 HC RX 637 (ALT 250 FOR IP): Performed by: PSYCHIATRY & NEUROLOGY

## 2023-02-06 PROCEDURE — 82306 VITAMIN D 25 HYDROXY: CPT

## 2023-02-06 PROCEDURE — 36415 COLL VENOUS BLD VENIPUNCTURE: CPT

## 2023-02-06 RX ORDER — CHOLECALCIFEROL (VITAMIN D3) 125 MCG
500 CAPSULE ORAL DAILY
Status: DISCONTINUED | OUTPATIENT
Start: 2023-02-06 | End: 2023-02-07 | Stop reason: HOSPADM

## 2023-02-06 RX ORDER — ERGOCALCIFEROL 1.25 MG/1
50000 CAPSULE ORAL WEEKLY
Status: DISCONTINUED | OUTPATIENT
Start: 2023-02-06 | End: 2023-02-07 | Stop reason: HOSPADM

## 2023-02-06 RX ADMIN — METOPROLOL SUCCINATE 50 MG: 50 TABLET, EXTENDED RELEASE ORAL at 08:46

## 2023-02-06 RX ADMIN — TRIAMTERENE AND HYDROCHLOROTHIAZIDE 1 TABLET: 37.5; 25 TABLET ORAL at 08:46

## 2023-02-06 RX ADMIN — TRAZODONE HYDROCHLORIDE 50 MG: 50 TABLET ORAL at 21:16

## 2023-02-06 RX ADMIN — FENOFIBRATE 160 MG: 160 TABLET ORAL at 08:46

## 2023-02-06 RX ADMIN — FAMOTIDINE 20 MG: 20 TABLET ORAL at 21:16

## 2023-02-06 RX ADMIN — VENLAFAXINE HYDROCHLORIDE 150 MG: 75 CAPSULE, EXTENDED RELEASE ORAL at 08:46

## 2023-02-06 NOTE — PROGRESS NOTES
Group Note    Date: 02/05/23  Start Time: :  20:30   End Time[de-identified]  21:00    Number of Participants: 16    Type of Group: Wrap-Up     Patient's Goal:  did not participate    Notes:      Status After Intervention:  Unchanged    Participation Level: None    Participation Quality:     Speech:  normal    Thought Process/Content:     Mood:     Level of consciousness:  Oriented x4    Response to Learning: did not participate     Modes of Intervention: Education and Support    Discipline Responsible: Registered Nurse     Signature:  Lesley Mata RN

## 2023-02-06 NOTE — PROGRESS NOTES
Department of Psychiatry  Attending Progress Note      SUBJECTIVE:    44years old female with previous psychiatric history of depression and anxiety disorder, who was admitted to our psychiatric unit after a suicidal attempt by overdose on prescribed Xanax. Patient has been seen in treatment team room. She reported that her condition significantly improved since time of admission to the hospital, stated that her mood is \"fine\" today. She endorses fair appetite and fair quality of sleep during the last night stated that she did not experience any difficulties to fall asleep or stay asleep, and slept 7-8 hours. Patient is compliant with currently prescribed medications and denies any side effects. She continues to endorse mild feeling of anxiety and mild depression, and rated both of them as 4 out of 10, with 10 being the worst.  Patient attends group activities in the unit and participates in those activities. She is social with medical staff and other patients in the unit. She performed her ADLs today and took a shower. Patient denies current active suicidal or homicidal ideations, denies any plans. Also, she denies auditory and visual hallucinations. OBJECTIVE    Physical  VITALS:  /76   Pulse 89   Temp 98.3 °F (36.8 °C) (Temporal)   Resp 16   Ht 5' 2\" (1.575 m)   Wt 170 lb (77.1 kg)   SpO2 97%   BMI 31.09 kg/m²   TEMPERATURE:  Current - Temp: 98.3 °F (36.8 °C);  Max - Temp  Av.5 °F (36.9 °C)  Min: 98.3 °F (36.8 °C)  Max: 98.6 °F (37 °C)  RESPIRATIONS RANGE: Resp  Av  Min: 16  Max: 18  PULSE RANGE: Pulse  Av.5  Min: 80  Max: 89  BLOOD PRESSURE RANGE:  Systolic (27JIN), RDM:598 , Min:101 , OEB:590  ; Diastolic (38XZR), DCO:58, Min:60, Max:76   PULSE OXIMETRY RANGE: SpO2  Av.5 %  Min: 96 %  Max: 97 %    Review of Systems: 14 point review of systems is negative    Psychological ROS: Positive for presence of mild anxiety and mild depression    Mental Status Examination: Appearance: Appropriately groomed, wearing casual civilian clothes. Made good eye contact. Behavior: Calm, cooperative, friendly, and socially appropriate. No psychomotor retardation/agitation appreciated. Gait unremarkable. Speech: Normal in tone, volume, and quality. Mood: \"fine\"   Affect: Mood congruent. Range is mildly labile  Thought Process: Appears linear and goal oriented. Thought Content: Patient does not have any current active suicidal and homicidal ideations. No overt delusions or paranoia appreciated. Perceptions: Seems patient does not have any auditory or visual hallucinations at present time. Patient did not appear to be responding to internal stimuli. No overt psychosis. Orientation: to person, place, date, and situation. Alert.    Impulsivity: Questionable  Neurovegitative: Fair appetite, fair sleep  Insight: Limited  Judgment: Limited       Data    Lab Results   Component Value Date    WQKTSMWM08 268 02/06/2023     Lab Results   Component Value Date    VITD25 18.0 (L) 02/06/2023        Medications  Current Facility-Administered Medications: vitamin D (ERGOCALCIFEROL) capsule 50,000 Units, 50,000 Units, Oral, Weekly  vitamin B-12 (CYANOCOBALAMIN) tablet 500 mcg, 500 mcg, Oral, Daily  acetaminophen (TYLENOL) tablet 650 mg, 650 mg, Oral, Q4H PRN  polyethylene glycol (GLYCOLAX) packet 17 g, 17 g, Oral, Daily PRN  hydrOXYzine HCl (ATARAX) tablet 25 mg, 25 mg, Oral, TID PRN  famotidine (PEPCID) tablet 20 mg, 20 mg, Oral, BID  metoprolol succinate (TOPROL XL) extended release tablet 50 mg, 50 mg, Oral, Daily  neomycin-bacitracin-polymyxin (NEOSPORIN) ointment, , Topical, 4x Daily PRN  venlafaxine (EFFEXOR XR) extended release capsule 150 mg, 150 mg, Oral, Daily  fenofibrate (TRIGLIDE) tablet 160 mg, 160 mg, Oral, Daily  triamterene-hydroCHLOROthiazide (MAXZIDE-25) 37.5-25 MG per tablet 1 tablet, 1 tablet, Oral, Daily  clonazePAM (KLONOPIN) disintegrating tablet 0.25 mg, 0.25 mg, Oral, Daily PRN  traZODone (DESYREL) tablet 50 mg, 50 mg, Oral, Nightly    ASSESSMENT AND PLAN    DSM-5 DIAGNOSIS:   Depression unspecified  Suicide attempt  Anxiety unspecified  Insomnia unspecified  Hypokalemia  Vitamin B12 deficiency  Vitamin D deficiency      Plan:   1. Psychiatric Medications:   Continue current psychotropic medications as recommended. Patient denies side effect of currently prescribed medications. No changes to the dose of prescribed psychotropic medications today. 2. Medical Issues:    Continue medical monitoring by Dr. Sailaja Voss and associates. Will start patient on vitamin B12 500 MCG daily for vitamin B12 deficiency  Will start patient on vitamin D 50,000 units weekly for vitamin D deficiency    3. Disposition:    Discharge patient home when she will be in stable mental condition and adjustment psychotropic medications will be done.      Amount of time spent with patient:    35 minutes with greater than 50% of the time spent in counseling and collaboration of care

## 2023-02-06 NOTE — PLAN OF CARE
Problem: Coping  Goal: Pt/Family able to verbalize concerns and demonstrate effective coping strategies  2/6/2023 1614 by Maria Bruno  Outcome: Progressing    Group Therapy Note     Date: 2/6/2023  Start Time: 8231  End Time:  1600  Number of Participants: 11     Type of Group: Recovery     Wellness Binder Information  Module Name:  emotional wellness  Session Number:  1     Patient's Goal:  validation of feelings     Notes:  pt acknowledged to have feelings validated it may be necessary to share feelings with others.      Status After Intervention:  Improved     Participation Level: Interactive     Participation Quality: Appropriate, Attentive, and Sharing        Speech:  normal        Thought Process/Content: Logical        Affective Functioning: Congruent        Mood: congruent        Level of consciousness:  Alert, Oriented x4, and Attentive        Response to Learning: Able to verbalize current knowledge/experience        Endings: None Reported     Modes of Intervention: Education        Discipline Responsible: Psychoeducational Specialist        Signature:  Maria Bruno

## 2023-02-06 NOTE — PROGRESS NOTES
[facility] Adult Unit Daily Assessment  Nursing Progress Note     Room: 06/608-01   Name: Jay Leary   Age: 44 y.o. Gender: female   Dx: Depression, unspecified depression type  Precautions: close watch and suicide risk  Inpatient Status: voluntary         SLEEP:  Sleep Quality   reports fair sleep with waking up off and on. Sleep Medications: Yes   PRN Sleep Meds: No         MEDICAL:  Other PRN Meds: No   Med Compliant: Yes  Accu-Chek: No  Oxygen/CPAP/BiPAP: No  CIWA/CINA: No   PAIN Assessment: none  Side Effects from medication: No        Medical Bed:   Is patient in a medical bed? no   If medical bed is in use, has nursing secured room while patient is awake and out of the room? NA  Has safety checks by nursing been completed on the bed/room this shift? NA     Protective Factors:  Patient identifies protective factors with nursing staff as follows: Identifies reasons for living: Yes              Supportive Social Network or family: Yes               Belief that suicide is immoral/high spirituality: Yes              Responsibility to family or others/living with family: Yes              Fear of death or dying due to pain and suffering: No              Engaged in work or school: Yes                If Patient is unable to identify, reason why? N/A        PSYCH:  Depression: 0   Anxiety: 0   SI denies suicidal ideation   Risk of Suicide: No Risk  HI Negative for homicidal ideation      AVH:no If Hallucinations are present, describe? GENERAL:  Appetite: improving  Percent Meals 75%  Social: minimal  Speech: normal   Appearance: appropriately dressed, disheveled, and healthy looking     GROUP:  Group Participation: attends some groups-Attending community morning meeting this am and filled out patient self inventory  Participation Quality: active listener, minimal       Notes: Patient is observed in day area isolated to self. Disheveled appearance. Minimally social with peers.   Alert and oriented x 4. Pleasant, calm, and cooperative. Friendly with staff and peers when engaged. Slightly withdrawn and guarded. She is attending select groups. Compliant with medications with no side effects noted. Reports fair sleep and fair appetite. Denies anxiety and depression. Denies suicidal ideation, homicidal ideation, and hallucinations. No evidence of psychosis.      Electronically signed by Hayder Mcbride RN on 2/6/23 at 11:02 AM CST

## 2023-02-06 NOTE — PROGRESS NOTES
called earlier and left number for callback at [phone]. He requested to talk to nurse earlier. Tried to call back twice and went straight to voicemail.

## 2023-02-06 NOTE — PLAN OF CARE
Problem: Coping  Goal: Pt/Family able to verbalize concerns and demonstrate effective coping strategies  Description: INTERVENTIONS:  1. Assist patient/family to identify coping skills, available support systems and cultural and spiritual values  2. Provide emotional support, including active listening and acknowledgement of concerns of patient and caregivers  3. Reduce environmental stimuli, as able  4. Instruct patient/family in relaxation techniques, as appropriate  5. Assess for spiritual pain/suffering and initiate Spiritual Care, Psychosocial Clinical Specialist consults as needed  Outcome: Progressing  Note:                                                                     Group Therapy Note    Date: 2/6/2023  Start Time: 1000  End Time:  1030  Number of Participants: 13    Type of Group: Psychoeducation    Wellness Binder Information  Module Name:  Relapse Prevention  Session Number:  5    Group Goal for Pt: To improve knowledge of relapse prevention strategies    Notes:  Pt demonstrated improved knowledge of relapse prevention strategies by actively participating in group discussion. Status After Intervention:  Unchanged    Participation Level:  Active Listener    Participation Quality: Appropriate and Attentive      Speech:  normal      Thought Process/Content: Logical      Affective Functioning: Congruent      Mood: anxious and depressed      Level of consciousness:  Alert and Oriented x4      Response to Learning: Able to verbalize current knowledge/experience, Able to verbalize/acknowledge new learning, and Progressing to goal      Endings: None Reported    Modes of Intervention: Education      Discipline Responsible: Psychoeducational Specialist      Signature:  Lizeth Whitlock

## 2023-02-06 NOTE — PROGRESS NOTES
Treatment Team Note:    Target Symptoms/Reason for admission: Per nursing admission assessment - Reason for Admission: Patient reports that she had been in a verbal argument with her  and had left the home and was at a local hotel. States that she began to text her  and then sent him a Lauryn text\" patient states she then went and took 4 additional 0.25 Xanax tablets at around 11:30 PM.  Patient's  notified their daughter who knew which hotel she was staying at he then called EMS. Diagnoses per psych provider: Intentional drug overdose, initial encounter ([facility] [address] Alex Leroy  Depression, unspecified depression type [F32. A]  Depression, unspecified [F32. A]    Therapist met with treatment team to discuss patients treatment and discharge plans.     Patient's aftercare plan is: SW will meet with patient to gather information    Aftercare appointments made: No - SW will make discharge appointments    Pt lives with: spouse    Collateral obtained from: SW will meet with patient to gather information  Collateral obtained on:New admission    Attending groups: No    Behavior: cooperative, isolative to room/self    Has patient been completing ADL's:   Appearance is disheveled    SI:  patient denies SI  Plan: no   If yes describe: N/A - patient denies plan  HI:  patient denies HI  If present describe: N/A  Delusions: patient denies delusions  If present describe: N/A  Hallucinations: patient denies hallucinations  If present describe: N/A    Patient rates their -- Depression: 1-10:  0  Anxiety:1-10:  0    Sleeping:Yes    Taking medication: Yes    Misc:

## 2023-02-06 NOTE — PSYCHOTHERAPY
Group Therapy Note    Date: 2/6/2023  Start Time: 1330  End Time:  1400  Number of Participants: 11    Type of Group: SPIRITUALITY     Wellness Binder Information  Module Name:  Mindfulness  Session Number:      Patient's Goal:  To rest the mind. .. Notes:      Status After Intervention:  Improved    Participation Level:  Active Listener and Interactive    Participation Quality: Appropriate, Attentive, Sharing, and Supportive      Speech:  normal      Thought Process/Content:       Affective Functioning: Congruent      Mood: calm      Level of consciousness:  Oriented x4 and Attentive      Response to Learning: Able to verbalize current knowledge/experience, Able to verbalize/acknowledge new learning, Able to retain information, and Capable of insight      Endings:     Modes of Intervention: Education, Support, Socialization, and Exploration      Discipline Responsible:       Signature:  Anatoliy Correa MA [facility]

## 2023-02-06 NOTE — PROGRESS NOTES
[facility] Adult Unit Daily Assessment  Nursing Progress Note    Room: [facility]-01   Name: Camille Méndez   Age: 44 y.o. Gender: female   Dx: Depression, unspecified depression type  Precautions: close watch and suicide risk  Inpatient Status: voluntary       SLEEP:  Sleep Quality     Sleep Medications: Yes   PRN Sleep Meds: No       MEDICAL:  Other PRN Meds: No   Med Compliant: Yes  Accu-Chek: No  Oxygen/CPAP/BiPAP: No  CIWA/CINA: No   PAIN Assessment: none  Side Effects from medication: No      Metabolic Screening:  No results found for: LABA1C  No results found for: CHOL  No results found for: TRIG  No results found for: HDL  No components found for: LDLCAL  No results found for: LABVLDL  Body mass index is 31.09 kg/m². BP Readings from Last 2 Encounters:   02/05/23 103/60   04/21/22 109/65         Medical Bed:   Is patient in a medical bed? no   If medical bed is in use, has nursing secured room while patient is awake and out of the room? NA  Has safety checks by nursing been completed on the bed/room this shift? NA    Protective Factors:  Patient identifies protective factors with nursing staff as follows: Identifies reasons for living: Yes   Supportive Social Network or family: Yes    Belief that suicide is immoral/high spirituality: Yes   Responsibility to family or others/living with family: Yes   Fear of death or dying due to pain and suffering: No   Engaged in work or school: Yes     If Patient is unable to identify, reason why? N/A      PSYCH:  Depression: 0   Anxiety: 0   SI denies suicidal ideation   Risk of Suicide: No Risk  HI Negative for homicidal ideation      AVH:no If Hallucinations are present, describe?          GENERAL:  Appetite: decreased   Percent Meals: No meals consumed this shift   Social: No   Speech: normal   Appearance: appropriately dressed, disheveled, and healthy looking    GROUP:  Group Participation: No  Participation Quality: None    Notes:   Patient observed resting in bed with eyes closed. Awakens easily for interview. Eye contact is fair, and she is cooperative with staff. She has isolated to her room this shift and is not social with peers. She declined to complete group wrap up tonight. She is compliant with medications. She currently denies SI, HI,and AVH.       Electronically signed by Indira Sung LPN on 6/3/49 at 85:51 PM CST

## 2023-02-06 NOTE — PLAN OF CARE
Group Therapy Note     Date: 2/6/2023  Start Time: 1100  End Time:  1130  Number of Participants: 9     Type of Group: Psychotherapy     Wellness Binder Information  Module Name:  relapse prevention  Session Number:  2     Patient's Goal:  identifying early warning signs     Notes:  pt was verbally prompted to attend group. Pt refused. Information about relapse prevention was provided. Status After Intervention:       Participation Level:      Participation Quality:         Speech:           Thought Process/Content:         Affective Functioning:         Mood:         Level of consciousness:          Response to Learning:         Endings:      Modes of Intervention:         Discipline Responsible: Psychoeducational Specialist        Signature:  Ilda Guy

## 2023-02-06 NOTE — PROGRESS NOTES
SW met with patient to complete psychosocial and lifetime CSSR-S on this date. Patients long and short-term goals discussed. Patient voiced understanding. Treatment plan sheet signed. Patient verbalized understanding of the treatment plan. Patient participated in goals and objectives of the treatment plan. Patient discussed safety plan with . SW explained treatment goals with pt. Decreasing depression and anxiety by improvement of positive coping patterns was discussed. Pt acknowledged understanding of treatment goals and signed treatment plan signature sheet. In the last 6 months has the patient been a danger to self: Yes  In the last 6 months has the patient been a danger to others: No  Legal Guardian/POA: No    Provided patient with [employer] Online handout entitled \"Quitting Smoking. \"  Reviewed handout with patient: addressing dangers of smoking, developing coping skills, and providing basic information about quitting. Patient received all components practical counseling of tobacco practical counseling during the hospital stay.

## 2023-02-07 VITALS
DIASTOLIC BLOOD PRESSURE: 77 MMHG | WEIGHT: 170 LBS | OXYGEN SATURATION: 97 % | BODY MASS INDEX: 31.28 KG/M2 | TEMPERATURE: 96.7 F | HEIGHT: 62 IN | RESPIRATION RATE: 20 BRPM | HEART RATE: 91 BPM | SYSTOLIC BLOOD PRESSURE: 104 MMHG

## 2023-02-07 PROCEDURE — 6370000000 HC RX 637 (ALT 250 FOR IP): Performed by: PSYCHIATRY & NEUROLOGY

## 2023-02-07 PROCEDURE — 6370000000 HC RX 637 (ALT 250 FOR IP): Performed by: FAMILY MEDICINE

## 2023-02-07 PROCEDURE — 5130000000 HC BRIDGE APPOINTMENT

## 2023-02-07 RX ORDER — HYDROXYZINE HYDROCHLORIDE 25 MG/1
25 TABLET, FILM COATED ORAL 3 TIMES DAILY PRN
Qty: 90 TABLET | Refills: 0 | Status: SHIPPED | OUTPATIENT
Start: 2023-02-07

## 2023-02-07 RX ORDER — TRAZODONE HYDROCHLORIDE 50 MG/1
50 TABLET ORAL NIGHTLY
Qty: 30 TABLET | Refills: 1 | Status: SHIPPED | OUTPATIENT
Start: 2023-02-07

## 2023-02-07 RX ORDER — VENLAFAXINE HYDROCHLORIDE 150 MG/1
150 CAPSULE, EXTENDED RELEASE ORAL DAILY
Qty: 30 CAPSULE | Refills: 1 | Status: SHIPPED | OUTPATIENT
Start: 2023-02-08

## 2023-02-07 RX ORDER — ERGOCALCIFEROL 1.25 MG/1
50000 CAPSULE ORAL WEEKLY
Qty: 12 CAPSULE | Refills: 0 | Status: SHIPPED | OUTPATIENT
Start: 2023-02-07 | End: 2023-04-26

## 2023-02-07 RX ADMIN — CYANOCOBALAMIN TAB 500 MCG 500 MCG: 500 TAB at 08:48

## 2023-02-07 RX ADMIN — TRIAMTERENE AND HYDROCHLOROTHIAZIDE 1 TABLET: 37.5; 25 TABLET ORAL at 08:48

## 2023-02-07 RX ADMIN — VENLAFAXINE HYDROCHLORIDE 150 MG: 75 CAPSULE, EXTENDED RELEASE ORAL at 08:48

## 2023-02-07 RX ADMIN — BACITRACIN ZINC NEOMYCIN SULFATE POLYMYXIN B SULFATE: 400; 3.5; 5 OINTMENT TOPICAL at 08:24

## 2023-02-07 RX ADMIN — FAMOTIDINE 20 MG: 20 TABLET ORAL at 08:48

## 2023-02-07 NOTE — DISCHARGE INSTR - DIET

## 2023-02-07 NOTE — PROGRESS NOTES
Group Note    Date: 02/07/23  Start Time: 7:45 AM   End Time:8:15 AM     Number of Participants: 12    Type of Group: Community/Goal     Patient's Goal:  myself    Notes:      Status After Intervention:      Participation Level:  Active Listener    Participation Quality: Appropriate    Speech:  normal    Thought Process/Content: Logical    Mood:  calm    Level of consciousness:  Alert    Response to Learning: Able to verbalize current knowledge/experience    Modes of Intervention: Education and Support    Discipline Responsible: Behavioral Health Technician     Signature:  Jessica Smith

## 2023-02-07 NOTE — PLAN OF CARE
Problem: Anxiety  Goal: Will report anxiety at manageable levels  2/7/2023 1139 by Peg Hilario  Outcome: Progressing   Group Therapy Note     Date: 2/7/2023  Start Time: 1100  End Time:  0557  Number of Participants: 12     Type of Group: Psychoeducation     Wellness Binder Information  Module Name:  emotional wellness  Session Number:  5     Patient's Goal:  obstacles to emotional wellness     Notes:  pt acknowledged negative thinking as an obstacle to emotional wellness.      Status After Intervention:  Improved     Participation Level: Interactive     Participation Quality: Appropriate, Attentive, and Sharing        Speech:  normal        Thought Process/Content: Logical        Affective Functioning: Congruent        Mood: congruent        Level of consciousness:  Alert, Oriented x4, and Attentive        Response to Learning: Able to verbalize current knowledge/experience        Endings: None Reported     Modes of Intervention: Education        Discipline Responsible: Psychoeducational Specialist        Signature:  Peg Hilario

## 2023-02-07 NOTE — PROGRESS NOTES
Behavioral Health   Discharge Note  Bridge Appointment completed: Reviewed Discharge Instructions with patient. Patient verbalizes understanding and agreement with the discharge plan using the teachback method. Referral for Outpatient Tobacco Cessation Counseling, upon discharge (elvie X if applicable and completed):    ( )  Hospital staff assisted patient to call Quit Line or faxed referral                                   during hospitalization                  ( )  Recognizing danger situations (included triggers and roadblocks), if not completed on admission                    ( )  Coping skills (new ways to manage stress, exercise, relaxation techniques, changing routine, distraction), if not completed on admission                                                           ( )  Basic information about quitting (benefits of quitting, techniques in how to quit, available resources, if not completed on admission  ( ) Referral for counseling faxed to [facility]   ( x) Patient refused referral  ( x) Patient refused counseling  (x ) Patient refused smoking cessation medication upon discharge    Vaccinations (elvie X if applicable and completed):  ( ) Patient states already received influenza vaccine elsewhere  ( ) Patient received influenza vaccine during this hospitalization  (x ) Patient refused influenza vaccine at this time      Pt discharged with followings belongings:       Valuables sent home with patient . Valuables retrieved from [facility] and returned to patient. Patient left department with  staff via  , discharged to home . Patient education on aftercare instructions: yes  Patient verbalize understanding of AVS:  yes. Suicidal Ideations? No Risk of Suicide: No Risk AVH? No  HI?  Negative for homicidal ideation       Status EXAM upon discharge:  Mental Status and Behavioral Exam  Normal: No  Level of Assistance: Independent/Self  Facial Expression: Brightened  Affect: Appropriate, Congruent  Level of Consciousness: Alert  Frequency of Checks: 4 times per hour, close  Mood:Normal: Yes  Mood: Depressed, Anxious, Sad  Motor Activity:Normal: Yes  Eye Contact: Good  Observed Behavior: Friendly, Cooperative  Sexual Misconduct History: Current - no  Preception: [place] to person, [place] to time, [place] to place, [place] to situation  Attention:Normal: Yes  Attention: Distractible  Thought Processes: Circumstantial  Thought Content:Normal: Yes  Thought Content: Preoccupations  Depression Symptoms: No problems reported or observed. Anxiety Symptoms: No problems reported or observed. Rose Symptoms: No problems reported or observed. Hallucinations: None  Delusions: No  Memory:Normal: Yes  Insight and Judgment: No  Insight and Judgment: Poor judgment    AVS/Transition Record has been discussed with patient and a copy was given to the patient. The AVS/Transition Record was faxed to the next level of care today.

## 2023-02-07 NOTE — DISCHARGE INSTRUCTIONS
Medications:   Medication Summary Provided. I understand that I should take only the medications on my list.   --why and when I need to take each medication. --which side effects to watch for.   --that I should carry my medication information at all times in case of emergency situations. --I will take all medications until discontinued by physician. I will take all my medications to follow up appointments. --check with my physician or pharmacist before taking any new medication, over the counter product or drink alcohol.   --ask about food, drug and dietary supplement interactions. --discard old lists and update records with medication providers. Behavior Health Follow Up:  Original Referral Source: ED  Discharge Diagnosis: [unfilled]  Recommendations for Level of Care: Follow Up  Patient Status at Discharge: Stable  [facility]  was: Aftercare plan faxed:    Faxed by: Social Work staff Jenny   Date: 23   Time:  will fax   Prescriptions sent with pt.     General Information:   Questions regarding your bill: Call [phone]   Suicide Hotline (Rescue Crisis) 6-895.588.3625   To obtain results of pending studies call Medical Records at: [phone]   For emergencies call: 911 24 hour/7 days a week contact information: [phone]

## 2023-02-07 NOTE — PROGRESS NOTES
Progress Note  Trina Reed  2/6/2023 10:08 PM  Subjective:   Admit Date:   2/5/2023      CC/ADMIT DX:       Interval History:   Reviewed overnight events and nursing notes. She has no new medical issues. I have reviewed all labs/diagnostics from the last 24hrs. ROS:   I have done a 10 point ROS and all are negative, except what is mentioned in the HPI. ADULT DIET; Regular    Medications:      vitamin D  50,000 Units Oral Weekly    vitamin B-12  500 mcg Oral Daily    famotidine  20 mg Oral BID    metoprolol succinate  50 mg Oral Daily    venlafaxine  150 mg Oral Daily    fenofibrate  160 mg Oral Daily    triamterene-hydroCHLOROthiazide  1 tablet Oral Daily    traZODone  50 mg Oral Nightly           Objective:   Vitals: /75   Pulse 79   Temp 97.3 °F (36.3 °C) (Temporal)   Resp 18   Ht 5' 2\" (1.575 m)   Wt 170 lb (77.1 kg)   SpO2 100%   BMI 31.09 kg/m²  No intake or output data in the 24 hours ending 02/06/23 2208  General appearance: alert and cooperative with exam  Extremities: extremities normal, atraumatic, no cyanosis or edema  Neurologic:  No obvious focal neurologic deficits. Skin: no rashes    Assessment and Plan:   Vit D Def  Depression  HTN    Plan:   Continue present medication(s)    Replace Vit D   Follow with Psych      Discharge planning:   her home     Reviewed treatment plans with the patient and/or family.              Electronically signed by Farzaneh Morley MD on 2/6/2023 at 10:08 PM

## 2023-02-07 NOTE — PROGRESS NOTES
Collateral obtained from: patient ( 20 years) Laura Nicole 56    Immediate Stressors & Time Episode Began: patient has been unfaithful for 5 years and her  kicked her out of the house and she was seeing her primary care and she stopped going to her therapist.     Diagnosis/Hx of compliance with meds: stopped taking medication    Tx Hx/Past hospitalizations:  caller reports no cassie treatment history    Family hx of psychiatric issues: caller reports family history of psychiatric issues -- history includes no issues    Substance Abuse: caller reports substance abuse history -- history includes patient drinks a lot     Pending Legal: caller reports no pending legal issues    Safety Issues (Weapons? Hx of attempts): The importance of locking weapons in a secured location was explained and recommended to collateral caller. No weapons are in the house    Support system/Medication Managed by: The importance of medication management and locking extra medication in a secured location was explained and reccommended to collateral caller.      Discharge Disposition: home -lives with family    Additional Info:

## 2023-02-07 NOTE — PLAN OF CARE
Problem: Risk for Elopement  Goal: Patient will not exit the unit/facility without proper excort  2/7/2023 1121 by Julaine Duane, RN  Outcome: Adequate for Discharge  2/7/2023 1103 by Julaine Duane, RN  Outcome: Adequate for Discharge  Flowsheets (Taken 2/7/2023 1053)  Nursing Interventions for Elopement Risk: Assist with personal care needs such as toileting, eating, dressing, as needed to reduce the risk of wandering

## 2023-02-07 NOTE — PROGRESS NOTES
Patient's  called shortly after shift change and reported that the reason why the patient tried to overdose was because he caught her cheating.  stated that he wanted to make sure that the patient was not lying.  did say that patient can still be discharged to his house but would not be able to stay long. Patient left name and phone number in case someone wanted to contact him.  Toby Ortiz [phone]    Electronically signed by Jonathan Dejesus on 2/7/2023 at 2:33 AM

## 2023-02-07 NOTE — PLAN OF CARE
Problem: Risk for Elopement  Goal: Patient will not exit the unit/facility without proper excort  Outcome: Adequate for Discharge  Flowsheets (Taken 2/7/2023 1053)  Nursing Interventions for Elopement Risk: Assist with personal care needs such as toileting, eating, dressing, as needed to reduce the risk of wandering

## 2023-02-07 NOTE — PROGRESS NOTES
[facility] Adult Unit Daily Assessment  Nursing Progress Note    Room: [facility]608-01   Name: Rafia Torres   Age: 44 y.o. Gender: female   Dx: Depression, unspecified depression type  Precautions: suicide risk  Inpatient Status: voluntary       SLEEP:  Sleep Quality Fair  Sleep Medications: Yes   PRN Sleep Meds: No       MEDICAL:  Other PRN Meds: No   Med Compliant: Yes  Accu-Chek: No  Oxygen/CPAP/BiPAP: No  CIWA/CINA: No   PAIN Assessment: none  Side Effects from medication: No      Metabolic Screening:  No results found for: LABA1C  No results found for: CHOL  No results found for: TRIG  No results found for: HDL  No components found for: LDLCAL  No results found for: LABVLDL  Body mass index is 31.09 kg/m². BP Readings from Last 2 Encounters:   02/06/23 107/75   04/21/22 109/65         Medical Bed:   Is patient in a medical bed? no   If medical bed is in use, has nursing secured room while patient is awake and out of the room? NA  Has safety checks by nursing been completed on the bed/room this shift? yes    Protective Factors:  Patient identifies protective factors with nursing staff as follows: Identifies reasons for living: Yes   Supportive Social Network or family: Yes    Belief that suicide is immoral/high spirituality: Yes   Responsibility to family or others/living with family: Yes   Fear of death or dying due to pain and suffering: No   Engaged in work or school: Yes     If Patient is unable to identify, reason why? PSYCH:  Depression: 0   Anxiety: 0   SI denies suicidal ideation   Risk of Suicide: No Risk  HI Negative for homicidal ideation      AVH:no If Hallucinations are present, describe? GENERAL:  Appetite: good   Percent Meals:    Social: No   Speech: normal   Appearance: appropriately dressed    GROUP:  Group Participation: Yes  Participation Quality: Minimal    Notes:     Patient spent much of evening on the phone. Patient was cooperative but restricted during questions.  Patient reports that she normally takes her fenofibrate and metoprolol at night. Patient appeared to be anxious but denied anxiety. Patient is resting, will continue to monitor.      Electronically signed by Henrique Virk on 2/7/23 at 2:57 AM CST

## 2023-02-07 NOTE — PROGRESS NOTES
Group Note    Date: 02/07/23  Start Time: 8:00 PM   End Time:8:30 PM     Number of Participants: 12    Type of Group: Wrap-Up     Patient's Goal:  to work on myself    Notes:  go over meds    Status After Intervention:  Unchanged    Participation Level: Minimal    Participation Quality: Appropriate    Speech:  normal    Thought Process/Content: Logical    Mood:  appropriate    Level of consciousness:  Alert and Oriented x4    Response to Learning: Progressing to goal    Modes of Intervention: Education and Support    Discipline Responsible: Registered Nurse     Signature:  Subha Garcia RN

## 2023-02-07 NOTE — DISCHARGE SUMMARY
Discharge Summary     Patient ID:  Ann Blair  [id number]  63 y.o.  1980    Admit date: 2/5/2023  Discharge date: 2/7/2023    Admitting Physician: Leonard Eli MD   Attending Physician: Leonard Eli MD  Discharge Provider: Jay Vazquez MD     Admission Diagnoses: Intentional drug overdose, initial encounter [facility]) [T50.902A]  Depression, unspecified depression type [F32. A]  Depression, unspecified [F32. A]    Discharge Diagnoses: Depression unspecified  Suicide attempt  Anxiety unspecified  Insomnia unspecified  Hypokalemia    Admission Condition: poor    Discharged Condition: stable    Indication for Admission: s/p suicidal attempt    HPI:  42-year-old white female with history of depression, anxiety, hypertension, brought to the ER after she reportedly attempted to overdose on Xanax. She is sent a text message to her  saying goodbye. Positive for benzodiazepine and cannabis. She is on prescribed Xanax. Patient is observed in the relaxation area. She is somewhat tearful. States she is doing better overall. No thoughts of hurting herself today. States she was acting impulsively yesterday after she argued with her . States  yelled at her and her daughter. They ended up going to a hotel. She was texting her . She eventually felt hopeless and took the pills. States she has been under a lot of stress. Her mother is currently at a nursing home. Patient lost her best friend in 2015. States she is still unable to get over that. She is not in therapy currently. States Effexor helps with depression and anxiety. Describes poor sleep at home. Occasional mood swings and irritability. No decreased need for sleep. Patient states she recently got a tattoo and would like some ointment for it. States she has talked to her  and daughter. She is planning to go back to her  upon discharge.      PSYCHIATRIC HISTORY:    Diagnoses: Depression, Edvin presents today for her OB visit.    Patient would like communication of their results via:   Raymond    Patient's current [facility] status: Active.     Chaperone needed:  No    [employer] - Patient is on [employer] Scheduling.           anxiety  Suicide attempts/gestures: Denies   Prior hospitalizations: Denies   Medication trials: Effexor, Xanax  Mental health contact: Lost to follow-up   Head trauma: Denies     SUBSTANCE USE HISTORY:  Denies Alcohol and illicits. Denies tobacco use. Hospital Course:   Patient was admitted to the adult [facility] behavioral health floor and was acclimated to the floor. Labs were reviewed and physical exam was completed by Dr. Raoul Ryan and associates. Home medications were reconciled. CRISTIAN was obtained and reviewed. Medication changes were made and patient tolerated well with no side effects. During the hospital stay patient's home medications have been restarted at previously prescribed and recommended dose, with the exception of Xanax. Xanax was changed to Klonopin as needed to prevent possible withdrawal seizures from benzodiazepines. However, patient did not take Klonopin during this hospital stay, reported that she did not experience any anxiety. Patient has been started on trazodone 50 mg at bedtime for insomnia with beneficial effect. Hydroxyzine 25 mg 3 times a day as needed has been prescribed for anxiety. While in the hospital patient has been diagnosed with vitamin B12 and vitamin D deficiencies and she has been started on vitamin B12 500 MCG daily and vitamin D 50,000 units weekly. Patient attended and participated in groups. The patient did interact well with other patients and staff on the unit. Behaviorally she was not a problem. Patient was compliant with her medications. Patient was sleeping through the night. This patient is not suicidal, homicidal or psychotic at discharge. She does not present a danger to self or others. With the above mentioned medications changes as well as psychotherapeutic interventions, the patient reported considerable improvement in her condition.  On 02/07/2023 it was therefore decided to discharge the patient, as it was felt that she received maximal benefit from her hospitalization.       Number of antipsychotic medication prescribed at discharge: None  IF MORE THAN ONE IS USED: NA she does not    History of greater than 3 failed multiple monotherapy trials: NA  Monotherapy taper plan/ cross taper in progress: PLACIDO  Augmentation of Clozapine: PLACIDO    Referral to addiction treatment: NA    Prescription for Alcohol or Drug Disorder Medication: NA    Prescription for Tobacco Cessation medication: NA    If no prescriptions for Tobacco Cessation must document why: NA    Consults: Internal medicine    Significant Diagnostic Studies:   Recent Labs     02/05/23  0049   WBC 8.7   RBC 4.65   HGB 13.6   HCT 41.3   MCV 88.8   MCH 29.2   MCHC 32.9*   RDW 12.3   *   MPV 10.3       Recent Labs     02/05/23  0049 02/06/23  0609    138   K 3.1* 3.9   CL 99 102   CO2 27 25   BUN 15 18   CREATININE 1.0* 1.0*   GLUCOSE 103 83   CALCIUM 10.0 9.7   PROT 7.4  --    LABALBU 4.0  --    BILITOT <0.2  --    ALKPHOS 61  --    AST 28  --    ALT 22  --        Recent Labs     02/05/23  0131   BARBSCNU Negative   LABBENZ POSITIVE*   LABMETH Negative   METAMPU Negative   BUPRENUR Negative        Treatments: therapies: SW    Mental Status Examination:  Alert, Oriented X 4  Appearance:  Proper Grooming and Hygiene  Speech with Regular Rate and Rhythm  Eye Contact:  Good  No Psychomotor Agitation/Retardation Noted  Attitude:  Cooperative  Mood:  \"Good\"  Affective: Congruent, appropriate to the situation, with a normal range and intensity  Thought Processes:  Coherently communicated, logical and goal oriented  Thought Content:  No Suicidal Ideation, No Homicidal Ideation, No Auditory or Visual Hallucinations, No Overt Delusions  Insight: Improved  Judgement: Improved  Memory is intact for both remote and recent  Intellectual Functioning:  Within the [address] of Knowledge:  Adequate  Attention and Concentration:  Adequate      Discharge Exam:  Please, see medical note    Disposition: home    Patient Instructions:   Current Discharge Medication List        START taking these medications    Details   traZODone (DESYREL) 50 MG tablet Take 1 tablet by mouth nightly  Qty: 30 tablet, Refills: 1      hydrOXYzine HCl (ATARAX) 25 MG tablet Take 1 tablet by mouth 3 times daily as needed for Anxiety  Qty: 90 tablet, Refills: 0      vitamin B-12 500 MCG tablet Take 1 tablet by mouth daily  Qty: 30 tablet, Refills: 2      Ergocalciferol (VITAMIN D) 65322 units CAPS Take 50,000 Units by mouth once a week for 12 doses  Qty: 12 capsule, Refills: 0           CONTINUE these medications which have CHANGED    Details   venlafaxine (EFFEXOR XR) 150 MG extended release capsule Take 1 capsule by mouth daily  Qty: 30 capsule, Refills: 1           CONTINUE these medications which have NOT CHANGED    Details   famotidine (PEPCID) 40 MG tablet Take 40 mg by mouth daily      triamterene-hydroCHLOROthiazide (MAXZIDE-25) 37.5-25 MG per tablet Take 1 tablet by mouth daily      Specialty Vitamins Products (BIOTIN PLUS KERATIN PO) Take by mouth daily      Multiple Vitamins-Minerals (MULTIVITAMIN ADULTS PO) Take by mouth daily      Rimegepant Sulfate (NURTEC) 75 MG TBDP Take 1 tablet at the onset of migraine. Do not exceed 1 tablet in 24 hours.   Qty: 8 tablet, Refills: 3      Galcanezumab-gnlm (EMGALITY) 120 MG/ML SOAJ Inject 120 mg into the skin every 30 days  Qty: 1 pen, Refills: 5      fenofibrate (TRICOR) 145 MG tablet Take 145 mg by mouth 3 times daily      metoprolol succinate (TOPROL XL) 50 MG extended release tablet Take 50 mg by mouth daily   Refills: 6           STOP taking these medications       ALPRAZolam (XANAX) 0.25 MG tablet Comments:   Reason for Stopping:         sertraline (ZOLOFT) 50 MG tablet Comments:   Reason for Stopping:         OLANZapine (ZYPREXA) 2.5 MG tablet Comments:   Reason for Stopping:             Activity: activity as tolerated  Diet: regular diet  Wound Care: none needed    Follow-up with  provider in 2 weeks.     Time worked: More than 31 minutes    Participation: good    Electronically signed by Deborah Wiggins MD on 2/7/2023 at 10:26 AM

## 2023-02-07 NOTE — PROGRESS NOTES
[facility] Adult Unit Daily Assessment  Nursing Progress Note    Room: [facility]/608-01   Name: Briana Hopper   Age: 44 y.o. Gender: female   Dx: Depression, unspecified depression type  Precautions: suicide risk  Inpatient Status: voluntary       SLEEP:  Sleep Quality Good  Sleep Medications: Yes   PRN Sleep Meds: No       MEDICAL:  Other PRN Meds: No   Med Compliant: Yes  Accu-Chek: No  Oxygen/CPAP/BiPAP: No  CIWA/CINA: No   PAIN Assessment: none  Side Effects from medication: No      Metabolic Screening:  No results found for: LABA1C  No results found for: CHOL  No results found for: TRIG  No results found for: HDL  No components found for: LDLCAL  No results found for: LABVLDL  Body mass index is 31.09 kg/m². BP Readings from Last 2 Encounters:   02/07/23 104/77   04/21/22 109/65         Medical Bed:   Is patient in a medical bed? no   If medical bed is in use, has nursing secured room while patient is awake and out of the room? no  Has safety checks by nursing been completed on the bed/room this shift? no    Protective Factors:  Patient identifies protective factors with nursing staff as follows: Identifies reasons for living: Yes   Supportive Social Network or family: Yes    Belief that suicide is immoral/high spirituality: Yes   Responsibility to family or others/living with family: Yes   Fear of death or dying due to pain and suffering: Yes   Engaged in work or school: No     If Patient is unable to identify, reason why? PSYCH:  Depression: 0   Anxiety: 0   SI denies suicidal ideation   Risk of Suicide: No Risk  HI Negative for homicidal ideation      AVH:no If Hallucinations are present, describe? GENERAL:  Appetite: good   Percent Meals: 100%   Social: Yes   Speech: normal   Appearance: appropriately dressed and healthy looking    GROUP:  Group Participation: Yes  Participation Quality: Active Listener and Interactive    Notes:   Patient rated depression 0/10, anxiety 0/10, denies SI, HI,and AVH. Patient is smiling with good eye contact during assessment. She reports she is feeling better and is hoping to be discharged today. Patient has been out of her room this morning attending groups and interacting with peers. Patient appears calm and able to maintain conversation during assessment. Will continue to monitor.        Electronically signed by Yuridia Ugalde RN on 2/7/23 at 11:07 AM CST

## 2023-02-07 NOTE — PROGRESS NOTES
Discharge Note     Patient is discharging on this date. Patient denies SI, HI, and AVH at this time. Patient reports improvement in behavior and is leaving unit in overall good condition. SW and patient discussed patient's follow up appointments and importance of attending appointments as scheduled, patient voiced understanding and agreement. Patient and SW also discussed patient's safety plan and patient was able to verbally identify: warning signs, coping strategies, places and people that help make the patient feel better/distract negative thoughts, friends/family/agencies/professionals the patient can reach out to in a crisis, and something that is important to the patient/worth living for. Patient was provided the national suicide prevention hotline number (9-460-114-318-685-6285) as well as local community behavioral health [facility]) crisis number for emergencies (3-165-223-444-052-8989). Discharge Disposition: home -lives with family      Pt to follow up with:  [id number] on February 9 , 2022 at 9:00 AM for the intake appointment. Patient will follow up with Madeline Freeman and ARIA Tabor  On February 16 , 2022   at 8:00 AM for the medication management appointment.      Referral to outpatient tobacco cessation counseling treatment:  Patient refused referral to outpatient tobacco cessation counseling    SW offered to assist patient with transportation, patient declined transportation assistance

## 2023-02-07 NOTE — PROGRESS NOTES
Treatment Team Note:     Target Symptoms/Reason for admission: Per nursing admission assessment - Reason for Admission: Patient reports that she had been in a verbal argument with her  and had left the home and was at a local hotel. States that she began to text her  and then sent him a Lauryn text\" patient states she then went and took 4 additional 0.25 Xanax tablets at around 11:30 PM.  Patient's  notified their daughter who knew which hotel she was staying at he then called EMS. Diagnoses per psych provider: Intentional drug overdose, initial encounter ([facility] [address] Guzmán Nick  Depression, unspecified depression type [F32. A]  Depression, unspecified [F32. A]     Therapist met with treatment team to discuss patients treatment and discharge plans. Patient's aftercare plan is: SW will meet with patient to gather information     Aftercare appointments made: No - SW will make discharge appointments     Pt lives with: spouse     Collateral obtained from:   Collateral obtained on:      Attending groups: No     Behavior: cooperative, isolative to room/self     Has patient been completing ADL's:   Appearance is disheveled     SI:  patient denies SI  Plan: no   If yes describe: N/A - patient denies plan  HI:  patient denies HI  If present describe: N/A  Delusions: patient denies delusions  If present describe: N/A  Hallucinations: patient denies hallucinations  If present describe: N/A     Patient rates their -- Depression: 1-10:  0  Anxiety:1-10:  0     Sleeping:Yes     Taking medication: Yes     Misc:

## 2023-02-08 NOTE — PROGRESS NOTES
Progress Note  Brandi Smith  2/8/2023 8:16 AM  Subjective:   Admit Date:   2/5/2023      CC/ADMIT DX:       Interval History:   Reviewed overnight events and nursing notes. She denies any physical complaints. I have reviewed all labs/diagnostics from the last 24hrs. ROS:   I have done a 10 point ROS and all are negative, except what is mentioned in the HPI. No diet orders on file    Medications:   REM  REM          Objective:   Vitals: /77   Pulse 91   Temp (!) 96.7 °F (35.9 °C) (Temporal)   Resp 20   Ht 5' 2\" (1.575 m)   Wt 170 lb (77.1 kg)   SpO2 97%   BMI 31.09 kg/m²  No intake or output data in the 24 hours ending 02/08/23 0816  General appearance: alert and cooperative with exam  Extremities: extremities normal, atraumatic, no cyanosis or edema  Neurologic:  No obvious focal neurologic deficits. Skin: no rashes    Assessment and Plan:   Vit D Def  Depression  HTN    Plan:   Continue present medication(s)    She is medically stable. I will monitor for any changes or concerns. Follow with Psych      Discharge planning:   her home     Reviewed treatment plans with the patient and/or family.              Electronically signed by Mariia Sanchez MD on 2/8/2023 at 8:16 AM

## 2023-03-03 RX ORDER — HYDROXYZINE HYDROCHLORIDE 25 MG/1
TABLET, FILM COATED ORAL
Qty: 90 TABLET | Refills: 0 | OUTPATIENT
Start: 2023-03-03

## 2023-03-27 ENCOUNTER — TELEPHONE (OUTPATIENT)
Dept: NEUROSURGERY | Age: 43
End: 2023-03-27

## 2023-03-27 NOTE — TELEPHONE ENCOUNTER
Patient called and stated she has been messaging back and forth on [facility] with someone about moving her appointment up. I spoke with La Nena Agarwal MA to see what was going on and to see if her appt could be sooner. La Nena Agarwal stated ARIA Bliss and Dr. Junior Mix will be out of office next week so she offered patient to see ARIA Last. Patient refused due to wanting to stick with her usual providers. Patient states \"well everytime im there they tell me if I need anything to call\". I told patient I understand that but they will not physically be here to see her. I told patient I would transfer her call to the . Transferred call.

## 2023-04-03 RX ORDER — RIMEGEPANT SULFATE 75 MG/75MG
TABLET, ORALLY DISINTEGRATING ORAL
Qty: 8 TABLET | Refills: 3 | Status: SHIPPED | OUTPATIENT
Start: 2023-04-03

## 2023-04-03 NOTE — TELEPHONE ENCOUNTER
Requested Prescriptions     Pending Prescriptions Disp Refills    NURTEC 75 MG TBDP [Pharmacy Med Name: Warner Ojeda 75 MG TBDP 75 Tablet] 8 tablet 3     Sig: TAKE 1 TABLET AT THE ONSET OF MIGRAINE. DO NOT EXCEED 1 TABLET IN 24 HOURS.        Last Office Visit: 4/20/2022  Next Office Visit: Visit date not found  Last Medication Refill: 7/1/2022 with 3 RF       Nico Willoughby patient

## 2023-04-19 ENCOUNTER — OFFICE VISIT (OUTPATIENT)
Dept: NEUROLOGY | Age: 43
End: 2023-04-19
Payer: COMMERCIAL

## 2023-04-19 VITALS
DIASTOLIC BLOOD PRESSURE: 93 MMHG | HEART RATE: 84 BPM | HEIGHT: 62 IN | WEIGHT: 170 LBS | OXYGEN SATURATION: 98 % | BODY MASS INDEX: 31.28 KG/M2 | SYSTOLIC BLOOD PRESSURE: 138 MMHG

## 2023-04-19 DIAGNOSIS — G43.009 MIGRAINE WITHOUT AURA AND WITHOUT STATUS MIGRAINOSUS, NOT INTRACTABLE: ICD-10-CM

## 2023-04-19 DIAGNOSIS — G43.409 HEMIPLEGIC MIGRAINE WITHOUT STATUS MIGRAINOSUS, NOT INTRACTABLE: Primary | ICD-10-CM

## 2023-04-19 PROCEDURE — 99213 OFFICE O/P EST LOW 20 MIN: CPT | Performed by: NURSE PRACTITIONER

## 2023-04-19 RX ORDER — GALCANEZUMAB 120 MG/ML
120 INJECTION, SOLUTION SUBCUTANEOUS
Qty: 1 ADJUSTABLE DOSE PRE-FILLED PEN SYRINGE | Refills: 11 | Status: SHIPPED | OUTPATIENT
Start: 2023-04-19

## 2023-04-19 RX ORDER — UBROGEPANT 100 MG/1
TABLET ORAL
Qty: 16 TABLET | Refills: 3 | Status: SHIPPED | OUTPATIENT
Start: 2023-04-19

## 2023-04-19 NOTE — PROGRESS NOTES
[address] [facility] Neurology Office Note      Patient:   Forest Varma  MR#:    [id number]  Account Number:                         YOB: 1980  Date of Evaluation:  2023  Time of Note:                          2:32 PM  Primary/Referring Physician:  Anaid Sequeira DO   Consulting Physician:  Edy Ko DNP, APRN    FOLLOW UP VISIT    Chief Complaint   Patient presents with    Follow-up    Migraine     C/o nurtec not helping when she does get a headache      HISTORY OF PRESENT ILLNESS  Forest Varma is here today for follow up of headaches. Overall headaches continue to be well controlled. No change in characteristics of headaches. Headache pain is frontal with radiation globally. She notes photophobia, nausea, scotoma like visual changes with \"spots\" in her vision with headaches. She has had prior hospitalizations for right sided weakness/headaches, felt to be hemiplegic migraine. She has not had any weakness with migraines recently. She is noting 2-3 headaches in a month. On Emgality monthly. She has Nurtec but hasn't seemed to be helping as much recently. Multiple treatment failures including Botox, Topamax, Verapamil, Lamictal, Gabapentin and Fioricet. Recent admission to [facility], denies SI/HI today.      Past Medical History:   Diagnosis Date    Anxiety     Depression     Hemiplegic migraine     Hypertension     Migraine        Past Surgical History:   Procedure Laterality Date     SECTION      HYSTERECTOMY (CERVIX STATUS UNKNOWN)         Family History   Problem Relation Age of Onset    Ataxia Mother     High Blood Pressure Mother     High Blood Pressure Father     COPD Father        Social History     Socioeconomic History    Marital status:      Spouse name: Not on file    Number of children: Not on file    Years of education: Not on file    Highest education level: Not on file   Occupational History    Not on file   Tobacco Use    Smoking status: Never    Smokeless tobacco: Never   Vaping

## 2023-04-19 NOTE — PATIENT INSTRUCTIONS
PLAN:  Continue Emgality 120mg monthly. Trial of Ubrelvy.  Triptans contraindicated r/t hemiplegic migraine variant   Continue follow up with psychiatry as previously scheduled  Follow up in 1 year, sooner with any worsening

## 2023-04-21 ENCOUNTER — CLINICAL DOCUMENTATION (OUTPATIENT)
Dept: NEUROLOGY | Age: 43
End: 2023-04-21

## 2023-04-21 NOTE — PROGRESS NOTES
RICARDO Meier Puff - Rx #: [id number] help?  Call us at [phone]  Status  Sent to [facility]  Drug  Ubrelvy 100MG tablets  Form  [place] [address]
yes

## 2023-04-28 RX ORDER — DM/PE/ACETAMINOPHEN/CHLORPHENR 10-5-325-2
TABLET, SEQUENTIAL ORAL
Qty: 30 TABLET | Refills: 2 | OUTPATIENT
Start: 2023-04-28

## 2023-04-28 RX ORDER — ERGOCALCIFEROL 1.25 MG/1
CAPSULE ORAL
Qty: 4 CAPSULE | Refills: 0 | OUTPATIENT
Start: 2023-04-28

## 2023-08-14 ENCOUNTER — HOSPITAL ENCOUNTER (EMERGENCY)
Age: 43
Discharge: HOME | End: 2023-08-14
Payer: COMMERCIAL

## 2023-08-14 VITALS
BODY MASS INDEX: 30.36 KG/M2 | HEART RATE: 73 BPM | DIASTOLIC BLOOD PRESSURE: 79 MMHG | OXYGEN SATURATION: 100 % | RESPIRATION RATE: 16 BRPM | WEIGHT: 165 LBS | SYSTOLIC BLOOD PRESSURE: 115 MMHG | HEIGHT: 62 IN | TEMPERATURE: 98.8 F

## 2023-08-14 DIAGNOSIS — K02.9 DENTAL CARIES: Primary | ICD-10-CM

## 2023-08-14 DIAGNOSIS — K08.89 DENTALGIA: ICD-10-CM

## 2023-08-14 PROCEDURE — 6360000002 HC RX W HCPCS: Performed by: PHYSICIAN ASSISTANT

## 2023-08-14 PROCEDURE — 99284 EMERGENCY DEPT VISIT MOD MDM: CPT

## 2023-08-14 PROCEDURE — 6370000000 HC RX 637 (ALT 250 FOR IP): Performed by: PHYSICIAN ASSISTANT

## 2023-08-14 PROCEDURE — 96372 THER/PROPH/DIAG INJ SC/IM: CPT

## 2023-08-14 RX ORDER — KETOROLAC TROMETHAMINE 30 MG/ML
30 INJECTION, SOLUTION INTRAMUSCULAR; INTRAVENOUS ONCE
Status: COMPLETED | OUTPATIENT
Start: 2023-08-14 | End: 2023-08-14

## 2023-08-14 RX ORDER — LIDOCAINE HYDROCHLORIDE 20 MG/ML
15 SOLUTION OROPHARYNGEAL ONCE
Status: COMPLETED | OUTPATIENT
Start: 2023-08-14 | End: 2023-08-14

## 2023-08-14 RX ORDER — CLINDAMYCIN HYDROCHLORIDE 300 MG/1
300 CAPSULE ORAL ONCE
Status: COMPLETED | OUTPATIENT
Start: 2023-08-14 | End: 2023-08-14

## 2023-08-14 RX ORDER — LIDOCAINE HYDROCHLORIDE 20 MG/ML
15 SOLUTION OROPHARYNGEAL PRN
Qty: 100 ML | Refills: 0 | Status: SHIPPED | OUTPATIENT
Start: 2023-08-14

## 2023-08-14 RX ORDER — CLINDAMYCIN HYDROCHLORIDE 300 MG/1
300 CAPSULE ORAL 2 TIMES DAILY
Qty: 14 CAPSULE | Refills: 0 | Status: SHIPPED | OUTPATIENT
Start: 2023-08-14 | End: 2023-08-21

## 2023-08-14 RX ADMIN — Medication 15 ML: at 20:42

## 2023-08-14 RX ADMIN — KETOROLAC TROMETHAMINE 30 MG: 30 INJECTION, SOLUTION INTRAMUSCULAR; INTRAVENOUS at 21:35

## 2023-08-14 RX ADMIN — TOPICAL ANESTHETIC: 200 SPRAY DENTAL; PERIODONTAL at 20:42

## 2023-08-14 RX ADMIN — CLINDAMYCIN HYDROCHLORIDE 300 MG: 300 CAPSULE ORAL at 21:34

## 2023-08-14 ASSESSMENT — ENCOUNTER SYMPTOMS
RHINORRHEA: 0
EYE DISCHARGE: 0
COLOR CHANGE: 0
SHORTNESS OF BREATH: 0
ABDOMINAL PAIN: 0
EYE PAIN: 0
NAUSEA: 0
BACK PAIN: 0
COUGH: 0
APNEA: 0
PHOTOPHOBIA: 0
SORE THROAT: 0
ABDOMINAL DISTENTION: 0

## 2023-08-14 ASSESSMENT — PAIN SCALES - GENERAL
PAINLEVEL_OUTOF10: 7
PAINLEVEL_OUTOF10: 7

## 2023-08-14 ASSESSMENT — PAIN DESCRIPTION - DESCRIPTORS: DESCRIPTORS: ACHING

## 2023-08-14 ASSESSMENT — PAIN DESCRIPTION - LOCATION: LOCATION: MOUTH

## 2023-08-14 ASSESSMENT — PAIN - FUNCTIONAL ASSESSMENT: PAIN_FUNCTIONAL_ASSESSMENT: 0-10

## 2023-08-14 ASSESSMENT — LIFESTYLE VARIABLES: HOW OFTEN DO YOU HAVE A DRINK CONTAINING ALCOHOL: NEVER

## 2023-08-15 NOTE — ED PROVIDER NOTES
LIDOCAINE VISCOUS HCL (XYLOCAINE) 2 % SOLN SOLUTION    Take 15 mLs by mouth as needed for Irritation       (Please note that portions of this note were completed with a voice recognition program.  Efforts were made to edit the dictations but occasionallywords are mis-transcribed.)    Maddison Espinoza, [address]  08/14/23 3810

## 2023-09-18 ENCOUNTER — CLINICAL DOCUMENTATION (OUTPATIENT)
Dept: NEUROLOGY | Age: 43
End: 2023-09-18

## 2023-09-18 RX ORDER — GALCANEZUMAB 120 MG/ML
120 INJECTION, SOLUTION SUBCUTANEOUS
Qty: 1 ADJUSTABLE DOSE PRE-FILLED PEN SYRINGE | Refills: 11 | OUTPATIENT
Start: 2023-09-18

## 2023-09-18 NOTE — TELEPHONE ENCOUNTER
Script sent on 4/19/23 with 11 refills. Refills are not needed at this time due to having a one year supply.

## 2023-09-18 NOTE — PROGRESS NOTES
Emgality 120MG/ML auto-injectors (migraine)  Status: PA Response - ApprovedCreated: September 15th, 2023 [id number]: September 18th, 2023

## 2023-10-18 RX ORDER — UBROGEPANT 100 MG/1
TABLET ORAL
Qty: 10 TABLET | Refills: 3 | Status: SHIPPED | OUTPATIENT
Start: 2023-10-18

## 2023-10-18 NOTE — TELEPHONE ENCOUNTER
Requested Prescriptions     Pending Prescriptions Disp Refills    UBRELVY 100 MG TABS [Pharmacy Med Name: UBRELVY 100 MG TABS 100 Tablet] 10 tablet 3     Sig: TAKE 1 TABLET AT THE ONSET OF MIGRAINE. MAY REPEAT ONCE IN 2 HOURS IF NO IMPROVEMENT. DO NOT EXCEED 2 TABLETS IN 24 HOURS.        Last Office Visit: 4/19/2023  Next Office Visit: 10/18/2023  Last Medication Refill: 4/19/2023 with 3 RF

## 2023-11-12 ENCOUNTER — APPOINTMENT (OUTPATIENT)
Dept: GENERAL RADIOLOGY | Facility: HOSPITAL | Age: 43
End: 2023-11-12
Payer: COMMERCIAL

## 2023-11-12 PROCEDURE — 73610 X-RAY EXAM OF ANKLE: CPT

## 2023-11-12 PROCEDURE — 73630 X-RAY EXAM OF FOOT: CPT

## 2023-11-18 ENCOUNTER — APPOINTMENT (OUTPATIENT)
Dept: GENERAL RADIOLOGY | Facility: HOSPITAL | Age: 43
End: 2023-11-18
Payer: COMMERCIAL

## 2023-11-18 PROCEDURE — 73030 X-RAY EXAM OF SHOULDER: CPT

## 2024-01-12 ENCOUNTER — HOSPITAL ENCOUNTER (EMERGENCY)
Facility: HOSPITAL | Age: 44
Discharge: HOME | End: 2024-01-13
Payer: COMMERCIAL

## 2024-01-12 DIAGNOSIS — K08.89 PAIN, DENTAL: Primary | ICD-10-CM

## 2024-01-12 PROCEDURE — 25010000002 KETOROLAC TROMETHAMINE PER 15 MG

## 2024-01-12 PROCEDURE — 99283 EMERGENCY DEPT VISIT LOW MDM: CPT

## 2024-01-12 PROCEDURE — 96372 THER/PROPH/DIAG INJ SC/IM: CPT

## 2024-01-12 RX ORDER — AMOXICILLIN AND CLAVULANATE POTASSIUM 875; 125 MG/1; MG/1
1 TABLET, FILM COATED ORAL 2 TIMES DAILY
Qty: 20 TABLET | Refills: 0 | Status: SHIPPED | OUTPATIENT
Start: 2024-01-12 | End: 2024-01-22

## 2024-01-12 RX ORDER — KETOROLAC TROMETHAMINE 15 MG/ML
15 INJECTION, SOLUTION INTRAMUSCULAR; INTRAVENOUS ONCE
Status: COMPLETED | OUTPATIENT
Start: 2024-01-12 | End: 2024-01-12

## 2024-01-12 RX ADMIN — TOPICAL ANESTHETIC 2 SPRAY: 200 SPRAY DENTAL; PERIODONTAL at 23:50

## 2024-01-12 RX ADMIN — KETOROLAC TROMETHAMINE 15 MG: 15 INJECTION, SOLUTION INTRAMUSCULAR; INTRAVENOUS at 23:50

## 2024-01-12 NOTE — Clinical Note
[facility] EMERGENCY DEPARTMENT  [address]  [facility] [address]  Phone: [phone]    Taylor Young was seen and treated in our emergency department on 1/12/2024.  She may return to work on 01/15/2024.         Thank you for choosing [facility].    Yossi Morton, APRN

## 2024-01-13 VITALS
DIASTOLIC BLOOD PRESSURE: 72 MMHG | RESPIRATION RATE: 18 BRPM | TEMPERATURE: 98.5 F | BODY MASS INDEX: 31.28 KG/M2 | SYSTOLIC BLOOD PRESSURE: 146 MMHG | HEIGHT: 62 IN | HEART RATE: 88 BPM | WEIGHT: 170 LBS | OXYGEN SATURATION: 98 %

## 2024-01-13 NOTE — ED PROVIDER NOTES
"Subjective   History of Present Illness  Patient is a 44-year-old female that presents to the emergency department for dental pain.  Patient reports she has had multiple dental caries throughout entirety of mouth.  She states that for the last 6 months she has noticed an abscess on the right upper front portion of her mouth.  Patient reports she has been able to easily control the pain prior to tonight.  She states that she popped the abscess and released \"yellowish-brown drainage from the affected area \".  Patient states following this she did release a lot of pressure to the affected area but after swallowing the drainage she did not feel well.  Patient denies any fevers, body aches, or chills.  She denies any abdominal pain, nausea, vomiting, constipation, or diarrhea.  She denies any difficulty swallowing, facial swelling, or shortness of breath.  Patient reports that she has not followed up with a primary care provider or dentist regarding this.      Review of Systems   HENT:  Positive for dental problem.    All other systems reviewed and are negative.      Past Medical History:   Diagnosis Date    Depression     Migraine     Plantar fasciitis        No Known Allergies    Past Surgical History:   Procedure Laterality Date     SECTION      x2    HYSTEROSCOPY         Family History   Problem Relation Age of Onset    Ataxia Mother     Hypertension Father     COPD Father        Social History     Socioeconomic History    Marital status:    Tobacco Use    Smoking status: Never    Smokeless tobacco: Never   Vaping Use    Vaping Use: Never used   Substance and Sexual Activity    Alcohol use: No    Drug use: No    Sexual activity: Defer           Objective   Physical Exam  Vitals and nursing note reviewed.   Constitutional:       Appearance: Normal appearance.      Comments: Nontoxic appearing. In no acute distress.    HENT:      Head: Normocephalic and atraumatic.      Right Ear: External ear normal. "      Left Ear: External ear normal.      Nose: Nose normal.      Mouth/Throat:      Mouth: Mucous membranes are moist.      Dentition: Dental tenderness and dental caries present. No gingival swelling or dental abscesses.      Tongue: Tongue does not deviate from midline.      Palate: No lesions.      Pharynx: Oropharynx is clear. Uvula midline. No pharyngeal swelling, oropharyngeal exudate, posterior oropharyngeal erythema or uvula swelling.      Tonsils: No tonsillar exudate or tonsillar abscesses. 0 on the right. 0 on the left.      Comments: Multiple dental caries noted throughout the mouth.  Dental fracture noted to the right upper incisor.  No dental abscess noted.  No gingival or facial swelling noted.  Eyes:      Extraocular Movements: Extraocular movements intact.      Conjunctiva/sclera: Conjunctivae normal.      Pupils: Pupils are equal, round, and reactive to light.   Cardiovascular:      Rate and Rhythm: Normal rate and regular rhythm.      Pulses: Normal pulses.      Heart sounds: Normal heart sounds.   Pulmonary:      Effort: Pulmonary effort is normal. No respiratory distress.      Breath sounds: Normal breath sounds. No wheezing.   Chest:      Chest wall: No tenderness.   Abdominal:      General: Abdomen is flat. Bowel sounds are normal. There is no distension.      Palpations: Abdomen is soft.      Tenderness: There is no abdominal tenderness. There is no right CVA tenderness, left CVA tenderness, guarding or rebound.   Musculoskeletal:         General: Normal range of motion.      Cervical back: Normal range of motion and neck supple.      Right lower leg: No edema.      Left lower leg: No edema.   Skin:     General: Skin is warm and dry.      Capillary Refill: Capillary refill takes less than 2 seconds.   Neurological:      General: No focal deficit present.      Mental Status: She is alert and oriented to person, place, and time. Mental status is at baseline.   Psychiatric:         Mood and  "Affect: Mood normal.         Behavior: Behavior normal.         Thought Content: Thought content normal.         Judgment: Judgment normal.         Procedures           ED Course                                             Medical Decision Making  Taylor Young is a 44 y.o. female who presents to the ED for dental pain.  Patient reports she has had multiple dental caries throughout entirety of mouth.  She states that for the last 6 months she has noticed an abscess on the right upper front portion of her mouth.  Patient reports she has been able to easily control the pain prior to tonight.  She states that she popped the abscess and released \"yellowish-brown drainage from the affected area \".  Patient states following this she did release a lot of pressure to the affected area but after swallowing the drainage she did not feel well.  Patient denies any fevers, body aches, or chills.  She denies any abdominal pain, nausea, vomiting, constipation, or diarrhea.  She denies any difficulty swallowing, facial swelling, or shortness of breath.  Patient reports that she has not followed up with a primary care provider or dentist regarding this.    Patient was non-toxic appearing on arrival. No acute distress was noted.  Vital signs stable.  Tachycardic upon arrival but upon reassessment of vitals first heart rate appears to be inaccurate.  Afebrile.    Past medical history, surgical history, and medication regimen reviewed.     Patient's presentation raises suspicion for differentials including, but not limited to, dental abscess, dental caries, dental fracture, facial cellulitis.    Medications administered,   benzocaine (HURRICAINE) 20 % liquid solution 2 spray   ketorolac (TORADOL) injection 15 mg     Given findings described above, patient's presentation is likely consistent with dental caries. I have a low suspicion for dental abscess or facial cellulitis at this point in their ED course.      I had an in-depth " discussion with the patient as well as significant other present bedside regarding physical exam findings.  We discussed that we are limited on what we can do in the ER as we are not dentist but I highly advised patient follow-up with a dentist regarding the multiple dental caries present.  I discussed that if patient did have a dental abscess she no longer has 1 upon physical exam.  I discussed that the tooth that patient points to that is causing the most pain does appear to be fractured and unfortunately patient will need to again, follow-up with a dentist regarding this for treatment.  I discussed that patient will be discharged with Augmentin that she will need to take twice a day for the next 10 days for possible dental abscess as well as infection prophylaxis. I answered all the questions regarding the emergency department evaluation, diagnosis, and treatment plan in plain and simple language that was understandable. We discussed that due to always having some diagnostic uncertainty while in the ER, there is always a chance that symptoms may change or new symptoms may reveal themselves after being discharged. Because of this, I stressed the importance of Taylor following up with their dentist. Patient informed that appointment will need to be done by calling their office to set up an appointment within the next few days or as soon as reasonably possible so that the symptoms can be re-evaluated for improvement or for any other questions. I also gave Taylor common sense return precautions and prompted patient to return to the emergency department within 24 - 48hrs if there are any new, worsening, or concerning symptoms. The patient verbalized understanding of the discharge instructions and agreed with them. Taylor was discharged in stable condition.     Dragon disclaimer:  Parts of this note may be an electronic transcription/translation of spoken language to printed text using the Dragon dictation system.        Problems Addressed:  Pain, dental: complicated acute illness or injury    Risk  OTC drugs.  Prescription drug management.        Final diagnoses:   Pain, dental       ED Disposition  ED Disposition       ED Disposition   Discharge    Condition   Stable    Comment   --               Ramon Pabon, DO  [address]  [facility] [id number] [phone]    Schedule an appointment as soon as possible for a visit       [facility]  [address], [place], [place] [id number] [phone]  Schedule an appointment as soon as possible for a visit today      [facility]  [address] [address] [phone]  Schedule an appointment as soon as possible for a visit       [facility] EMERGENCY DEPARTMENT  [address] [place] [address] [phone]    If symptoms worsen         Medication List        New Prescriptions      amoxicillin-clavulanate 875-125 MG per tablet  Commonly known as: AUGMENTIN  Take 1 tablet by mouth 2 (Two) Times a Day for 10 days.               Where to Get Your Medications        These medications were sent to [id number] - [place] [facility] - [address] - [phone]  - [phone] FX  [address] [facility] [address]      Phone: [phone]   amoxicillin-clavulanate 875-125 MG per tablet            Yossi Morton, APRN  01/13/24 0019

## 2024-01-13 NOTE — DISCHARGE INSTRUCTIONS
It was very nice to meet you, Taylor. Thank you for allowing us to take care of you today at [facility].    Today you were seen in the emergency department for your symptoms. Please understand that an ER evaluation is just the start of your evaluation. We do the best we can, but we are often unable to fully find what is causing your symptoms from one evaluation.  Because of this, the goal is to determine whether you need to be evaluated in the hospital or if it is safe for you to go home and see other doctors provided such as primary care physicians or specialist on an outpatient basis.     Like we discussed, I strongly urge that you follow up with your dentist. Please call their office to set up an appointment as soon as possible so that you can be re-evaluated for improvement in your symptoms or for any other questions.  I have provided the information needed, including phone number, to call to set up an appointment below in these discharge papers.     Educational material has also been provided in the following pages regarding what we have discussed today.     MEDICATIONS PRESCRIBED: Augmentin twice a day for the next 10 days    Please return to the emergency room within 12-48 hours if you experience symptoms such as the following:   Fever, chills, chest pain or shortness of breath, pain with inspiration/expiration, pain that travels to your arms, neck or back, nausea, vomiting, severe headache, tearing pain in your chest, dizziness, feel as though you are about to pass out, OR if you have any worsening symptoms, or any other concerns.

## 2024-01-16 ENCOUNTER — OFFICE VISIT (OUTPATIENT)
Dept: OBSTETRICS AND GYNECOLOGY | Facility: CLINIC | Age: 44
End: 2024-01-16
Payer: COMMERCIAL

## 2024-01-16 VITALS
WEIGHT: 168 LBS | SYSTOLIC BLOOD PRESSURE: 120 MMHG | DIASTOLIC BLOOD PRESSURE: 70 MMHG | HEIGHT: 62 IN | RESPIRATION RATE: 18 BRPM | BODY MASS INDEX: 30.91 KG/M2

## 2024-01-16 DIAGNOSIS — Z12.4 SCREENING FOR MALIGNANT NEOPLASM OF CERVIX: ICD-10-CM

## 2024-01-16 DIAGNOSIS — N89.8 VAGINAL DRYNESS: ICD-10-CM

## 2024-01-16 DIAGNOSIS — Z01.419 ENCOUNTER FOR ANNUAL ROUTINE GYNECOLOGICAL EXAMINATION: Primary | ICD-10-CM

## 2024-01-16 DIAGNOSIS — Z12.31 SCREENING MAMMOGRAM FOR BREAST CANCER: ICD-10-CM

## 2024-01-16 DIAGNOSIS — Z11.3 SCREENING EXAMINATION FOR VENEREAL DISEASE: ICD-10-CM

## 2024-01-16 DIAGNOSIS — N95.1 PERIMENOPAUSAL: ICD-10-CM

## 2024-01-16 DIAGNOSIS — Z90.711 HISTORY OF HYSTERECTOMY, SUPRACERVICAL: ICD-10-CM

## 2024-01-16 PROBLEM — G43.E09 CHRONIC MIGRAINE WITH AURA: Status: ACTIVE | Noted: 2021-05-03

## 2024-01-16 PROCEDURE — 87591 N.GONORRHOEAE DNA AMP PROB: CPT | Performed by: OBSTETRICS & GYNECOLOGY

## 2024-01-16 PROCEDURE — 87529 HSV DNA AMP PROBE: CPT | Performed by: OBSTETRICS & GYNECOLOGY

## 2024-01-16 PROCEDURE — 87624 HPV HI-RISK TYP POOLED RSLT: CPT | Performed by: OBSTETRICS & GYNECOLOGY

## 2024-01-16 PROCEDURE — 87491 CHLMYD TRACH DNA AMP PROBE: CPT | Performed by: OBSTETRICS & GYNECOLOGY

## 2024-01-16 PROCEDURE — 87661 TRICHOMONAS VAGINALIS AMPLIF: CPT | Performed by: OBSTETRICS & GYNECOLOGY

## 2024-01-16 PROCEDURE — G0123 SCREEN CERV/VAG THIN LAYER: HCPCS | Performed by: OBSTETRICS & GYNECOLOGY

## 2024-01-16 RX ORDER — MODAFINIL 200 MG/1
TABLET ORAL
COMMUNITY
Start: 2023-12-27

## 2024-01-16 NOTE — PROGRESS NOTES
Morris Leary MD  [facility]  [address]  [place] [address]  Office [phone]  Fax [phone]      [facility]  Taylor Young  : 1980  MRN: [id number]    Subjective   Subjective     Chief Complaint   Patient presents with    Gynecologic Exam     Patient is here for annual well GYN Exam. Last well GYN exam 2 years ago. Hx of JANEE/ BLS. Last Mammo 2023 at [facility].   Pt also wants to discuss her hot flashes, night sweats, vaginal dryness, fatigue and brito.        History of Present Illness  Taylor Young is a 44 y.o. female , , who comes to the office today for annual. History notable for JANEE/BSG in  secondary to abnormal uterine bleeding. Reports ovaries still in place. No bleeding. No pelvic pain or pain with intercourse. Notes a few months of increasing hot flashes, vaginal dryness, night sweets, fatigue, and mood changes/irritability. Older sister has not gone through menopause yet. Mother may have went through menopause closer to her 50s. No other complaints. Denies breast complaints. .      Review of Systems   Genitourinary:  Negative for decreased urine volume, difficulty urinating, dyspareunia, dysuria, enuresis, flank pain, frequency, genital sores, hematuria, menstrual problem, pelvic pain, urgency, vaginal bleeding, vaginal discharge and vaginal pain.   Psychiatric/Behavioral:  Positive for decreased concentration and sleep disturbance. Negative for agitation, behavioral problems, confusion, dysphoric mood, hallucinations, self-injury and suicidal ideas. The patient is nervous/anxious. The patient is not hyperactive.    All other systems reviewed and are negative.       OB hx:  OB History    Para Term  AB Living   2 2 2     1   SAB IAB Ectopic Molar Multiple Live Births             1      # Outcome Date GA Lbr Andrea/2nd Weight Sex Delivery Anes PTL Lv   2 Term    3345 g (7 lb 6 oz) M CS-LTranv   VI   1 Term    3118 g (6 lb 14 oz) F  CS-LTranv           GYN hx:  Date of LMP: No LMP recorded. Patient has had a hysterectomy.  Age at menarche: 11    Menopause: perimenopausal age 44  Menses: s/p hysterectomy 2013, supracervical  Flow: s/p hysterectomy  Date/Result of last Pap smear: she reports her last PAP was normal   History of abnormal PAP: Yes. Details: h/o colposcopy/cryotherapy  Date/Result of last mammogram: she reports her last mammogram was normal, 1/26/24 at [facility] [place]  History of Abnormal Mammogram: No  Date/Result of last colonoscopy: has not had colonoscopy.  This was recommended.  History of Abnormal colonoscopy: No  Date/Result of last DEXA: None  History of Abnormal DEXA: No  HPV Vaccination: no  History of Cervical Dysplasia: Yes. Details: h/o cryotherapy  History of Vulvar Dysplasia: No  History of Sexually Transmitted Infection: No  Current Birth Control Method:  s/p hysterectomy  History of Contraception: Yes. Details: h/o OCPs, h/o mirena IUD  HRT: No  Sexually active: Yes. Details: male partner   FMH of Breast, Uterine, Ovarian, or Colon cancer: No  Additional OB/GYN History (not otherwise listed):  -h/o c/s x2  -h/o AUB s/p IUD->D&C, ablation, hysterectomy    Personal History     The following portions of the patient's history were reviewed and updated as appropriate: allergies, current medications, past family history, past medical history, past social history, past surgical history, and problem list.    History Review Reviewed Comments   Past Medical History:  [x]     Past Surgical History: [x]     Family History: [x]     Social History: [x]       Current Outpatient Medications   Medication Instructions    amoxicillin-clavulanate (AUGMENTIN) 875-125 MG per tablet 1 tablet, Oral, 2 Times Daily    Emgality 120 mg, Subcutaneous    famotidine (PEPCID) 40 mg, Oral, Every Night at Bedtime    fenofibrate (TRICOR) 145 mg, Oral, Daily    GNP Vitamin B-12 500 MCG tablet TAKE 1 TABLET BY MOUTH DAILY 30 DAYS    hydrOXYzine pamoate  "(VISTARIL) 25 MG capsule TAKE 1 CAPSULE BY ORAL ROUTE 3 TIMES PER DAY AS NEEDED FOR ANXIETY    metoprolol succinate XL (TOPROL-XL) 50 MG 24 hr tablet 1 tablet, Oral, Daily    modafinil (PROVIGIL) 200 MG tablet TAKE 1 TABLET BY MOUTH EVERY DAY IN THE MORNING FOR 30 DAYS    triamterene-hydrochlorothiazide (DYAZIDE) 37.5-25 MG per capsule 1 capsule, Oral, Daily    Ubrelvy 100 MG tablet TAKE 1 TABLET AT THE ONSET OF MIGRAINE. MAY REPEAT ONCE IN 2 HOURS IF NO IMPROVEMENT. DO NOT EXCEED 2 TABLETS IN 24 HOURS.    venlafaxine XR (EFFEXOR-XR) 150 mg, Oral, Daily    vitamin D (ERGOCALCIFEROL) 1.25 MG (87414 UT) capsule capsule        No Known Allergies    Objective    Objective     Vitals:   Visit Vitals  /70   Resp 18   Ht 157.5 cm (62\")   Wt 76.2 kg (168 lb)   BMI 30.73 kg/m²        Physical Exam  Vitals and nursing note reviewed. Exam conducted with a chaperone present (Consent for exam obtained verbally from patient.).   Constitutional:       General: She is not in acute distress.     Appearance: Normal appearance. She is well-developed.   HENT:      Head: Normocephalic and atraumatic.   Eyes:      General: No scleral icterus.     Conjunctiva/sclera: Conjunctivae normal.   Pulmonary:      Effort: Pulmonary effort is normal. No respiratory distress.   Chest:   Breasts:     Breasts are symmetrical.      Right: Normal.      Left: Normal.   Abdominal:      General: There is no distension.      Palpations: Abdomen is soft. There is no mass.      Tenderness: There is no abdominal tenderness.   Genitourinary:     General: Normal vulva.      Exam position: Lithotomy position.      Pubic Area: No pubic lice.       Labia:         Right: No tenderness or lesion.         Left: No tenderness or lesion.       Urethra: No prolapse.      Vagina: No foreign body. No vaginal discharge, tenderness, bleeding, lesions or prolapsed vaginal walls.      Cervix: Normal.      Uterus: Absent.       Adnexa:         Right: No mass, tenderness " or fullness.          Left: No mass, tenderness or fullness.        Rectum: No external hemorrhoid.      Comments: Vaginal mucosa is not pale and rugae are present  Musculoskeletal:      Right lower leg: No edema.      Left lower leg: No edema.   Lymphadenopathy:      Upper Body:      Right upper body: No supraclavicular, axillary or pectoral adenopathy.      Left upper body: No supraclavicular, axillary or pectoral adenopathy.   Skin:     General: Skin is warm and dry.      Coloration: Skin is not cyanotic, jaundiced or pale.   Neurological:      General: No focal deficit present.      Mental Status: She is alert and oriented to person, place, and time.   Psychiatric:         Mood and Affect: Mood normal.         Behavior: Behavior is cooperative.         Result Review    Converted Surgical Pathology (11/22/2013 05:38 EST)   Uterus:       A.     Cervix:                    Supracervical specimen.        B.     Endometrium:               No hyperplasia or malignancy with tubal   metaplasia seen.       C.     Myometrium:               Leiomyomata uteri.         D.     Possible fallopian tube:       No lumen identifiable.           Assessment & Plan   Assessment / Plan     Diagnoses and all orders for this visit:    1. Encounter for annual routine gynecological examination (Primary)    2. Screening for malignant neoplasm of cervix  -     Liquid-based Pap Smear, Screening    3. Screening examination for venereal disease  -     Liquid-based Pap Smear, Screening  -     RPR  -     HIV-1 / O / 2 Ag / Antibody  -     Hepatitis Panel, Acute    4. Screening mammogram for breast cancer  Comments:  Noe scheduled 1/26/24 for mammogram    5. History of hysterectomy, supracervical    6. Perimenopausal  -     Follicle Stimulating Hormone  -     Estradiol  -     Testosterone  -     Progesterone  -     Luteinizing Hormone        Discussion:   BMI Body mass index is 30.73 kg/m²..   Colonoscopy: at age 46  Mammogram: Up to  date  DEXA:  at age 66 or sooner  Pap smear, per ASCCP guidelines: Done today  STI screening: desires  Contraception: s/p hysterectomy  HPV vaccine: n/a    Encouraged self breast awareness.  Encouraged proactive weight management and importance of maintaining a healthy weight.   Encouraged regular exercise and the importance of same, in regards to a healthy heart as well as helping to maintain her weight and improving her mental health.      Perimenopausal - with symptoms. Check labs today. HRT discussed including its use and contraindications. Recommended awaiting lipid panel (from PCP) and mammogram before considering this as an option. OTC and home remedies discussed. For dryness, discussed lubricants and moisturizers. Questions answered. She expressed understanding.     Follow-up: Return in about 1 year (around 1/16/2025), or if symptoms worsen or fail to improve, for annual.    Morris Leary MD

## 2024-01-17 LAB
ESTRADIOL SERPL-MCNC: 95.5 PG/ML
FSH SERPL-ACNC: 1.9 MIU/ML
HAV IGM SERPL QL IA: NEGATIVE
HBV CORE IGM SERPL QL IA: NEGATIVE
HBV SURFACE AG SERPL QL IA: NEGATIVE
HCV AB SERPL QL IA: NORMAL
HCV IGG SERPL QL IA: NON REACTIVE
HIV 1+2 AB+HIV1 P24 AG SERPL QL IA: NON REACTIVE
LH SERPL-ACNC: 3.3 MIU/ML
PROGEST SERPL-MCNC: 5.7 NG/ML
RPR SER QL: NON REACTIVE
TESTOST SERPL-MCNC: <3 NG/DL (ref 4–50)

## 2024-01-18 ENCOUNTER — PATIENT ROUNDING (BHMG ONLY) (OUTPATIENT)
Dept: OBSTETRICS AND GYNECOLOGY | Facility: CLINIC | Age: 44
End: 2024-01-18
Payer: COMMERCIAL

## 2024-01-18 LAB
C TRACH RRNA CVX QL NAA+PROBE: NOT DETECTED
GEN CATEG CVX/VAG CYTO-IMP: NORMAL
HPV I/H RISK 4 DNA CVX QL PROBE+SIG AMP: NOT DETECTED
HSV1 DNA SPEC QL NAA+PROBE: NOT DETECTED
HSV2 DNA SPEC QL NAA+PROBE: NOT DETECTED
LAB AP CASE REPORT: NORMAL
LAB AP GYN ADDITIONAL INFORMATION: NORMAL
Lab: NORMAL
N GONORRHOEA RRNA SPEC QL NAA+PROBE: NOT DETECTED
PATH INTERP SPEC-IMP: NORMAL
STAT OF ADQ CVX/VAG CYTO-IMP: NORMAL
TRICHOMONAS VAGINALIS PCR: NOT DETECTED

## 2024-01-18 NOTE — PROGRESS NOTES
January 18, 2024    Hello, may I speak with Taylor Young?    My name is Alisia      I am  with [facility]  [address], [address]  [place] [address] [phone].    Before we get started may I verify your date of birth? 1980    I am calling to officially welcome you to our practice and ask about your recent visit. Is this a good time to talk? yes    Tell me about your visit with us. What things went well?  everything was great.  I love Dr Leary!       We're always looking for ways to make our patients' experiences even better. Do you have recommendations on ways we may improve?  no    Overall were you satisfied with your first visit to our practice? yes       I appreciate you taking the time to speak with me today. Is there anything else I can do for you? no      Thank you, and have a great day.

## 2024-02-03 ENCOUNTER — HOSPITAL ENCOUNTER (EMERGENCY)
Facility: HOSPITAL | Age: 44
Discharge: HOME | End: 2024-02-03
Attending: EMERGENCY MEDICINE
Payer: COMMERCIAL

## 2024-02-03 VITALS
WEIGHT: 165 LBS | RESPIRATION RATE: 16 BRPM | BODY MASS INDEX: 30.36 KG/M2 | HEART RATE: 87 BPM | HEIGHT: 62 IN | OXYGEN SATURATION: 100 % | TEMPERATURE: 97.6 F | DIASTOLIC BLOOD PRESSURE: 66 MMHG | SYSTOLIC BLOOD PRESSURE: 141 MMHG

## 2024-02-03 DIAGNOSIS — T30.0 THERMAL BURN: Primary | ICD-10-CM

## 2024-02-03 DIAGNOSIS — B99.9 LOCALIZED INFECTION: ICD-10-CM

## 2024-02-03 LAB
ANION GAP SERPL CALCULATED.3IONS-SCNC: 17 MMOL/L (ref 5–15)
BASOPHILS # BLD AUTO: 0.05 10*3/MM3 (ref 0–0.2)
BASOPHILS NFR BLD AUTO: 0.5 % (ref 0–1.5)
BUN SERPL-MCNC: 17 MG/DL (ref 6–20)
BUN/CREAT SERPL: 15.7 (ref 7–25)
CALCIUM SPEC-SCNC: 10.1 MG/DL (ref 8.6–10.5)
CHLORIDE SERPL-SCNC: 98 MMOL/L (ref 98–107)
CO2 SERPL-SCNC: 24 MMOL/L (ref 22–29)
CREAT SERPL-MCNC: 1.08 MG/DL (ref 0.57–1)
CRP SERPL-MCNC: 1.15 MG/DL (ref 0–0.5)
DEPRECATED RDW RBC AUTO: 38.2 FL (ref 37–54)
EGFRCR SERPLBLD CKD-EPI 2021: 65.5 ML/MIN/1.73
EOSINOPHIL # BLD AUTO: 0.04 10*3/MM3 (ref 0–0.4)
EOSINOPHIL NFR BLD AUTO: 0.4 % (ref 0.3–6.2)
ERYTHROCYTE [DISTWIDTH] IN BLOOD BY AUTOMATED COUNT: 12 % (ref 12.3–15.4)
GLUCOSE SERPL-MCNC: 87 MG/DL (ref 65–99)
HCT VFR BLD AUTO: 42.7 % (ref 34–46.6)
HGB BLD-MCNC: 14.2 G/DL (ref 12–15.9)
IMM GRANULOCYTES # BLD AUTO: 0.05 10*3/MM3 (ref 0–0.05)
IMM GRANULOCYTES NFR BLD AUTO: 0.5 % (ref 0–0.5)
LYMPHOCYTES # BLD AUTO: 0.78 10*3/MM3 (ref 0.7–3.1)
LYMPHOCYTES NFR BLD AUTO: 7.4 % (ref 19.6–45.3)
MCH RBC QN AUTO: 28.8 PG (ref 26.6–33)
MCHC RBC AUTO-ENTMCNC: 33.3 G/DL (ref 31.5–35.7)
MCV RBC AUTO: 86.6 FL (ref 79–97)
MONOCYTES # BLD AUTO: 0.54 10*3/MM3 (ref 0.1–0.9)
MONOCYTES NFR BLD AUTO: 5.1 % (ref 5–12)
NEUTROPHILS NFR BLD AUTO: 86.1 % (ref 42.7–76)
NEUTROPHILS NFR BLD AUTO: 9.1 10*3/MM3 (ref 1.7–7)
NRBC BLD AUTO-RTO: 0 /100 WBC (ref 0–0.2)
PLATELET # BLD AUTO: 441 10*3/MM3 (ref 140–450)
PMV BLD AUTO: 10 FL (ref 6–12)
POTASSIUM SERPL-SCNC: 3.4 MMOL/L (ref 3.5–5.2)
PROCALCITONIN SERPL-MCNC: 0.04 NG/ML (ref 0–0.25)
RBC # BLD AUTO: 4.93 10*6/MM3 (ref 3.77–5.28)
SODIUM SERPL-SCNC: 139 MMOL/L (ref 136–145)
WBC NRBC COR # BLD AUTO: 10.56 10*3/MM3 (ref 3.4–10.8)

## 2024-02-03 PROCEDURE — 96374 THER/PROPH/DIAG INJ IV PUSH: CPT

## 2024-02-03 PROCEDURE — 86140 C-REACTIVE PROTEIN: CPT | Performed by: EMERGENCY MEDICINE

## 2024-02-03 PROCEDURE — 80048 BASIC METABOLIC PNL TOTAL CA: CPT | Performed by: EMERGENCY MEDICINE

## 2024-02-03 PROCEDURE — 84145 PROCALCITONIN (PCT): CPT | Performed by: EMERGENCY MEDICINE

## 2024-02-03 PROCEDURE — 90715 TDAP VACCINE 7 YRS/> IM: CPT | Performed by: EMERGENCY MEDICINE

## 2024-02-03 PROCEDURE — 99283 EMERGENCY DEPT VISIT LOW MDM: CPT

## 2024-02-03 PROCEDURE — 25010000002 TETANUS-DIPHTH-ACELL PERTUSSIS 5-2.5-18.5 LF-MCG/0.5 SUSPENSION PREFILLED SYRINGE: Performed by: EMERGENCY MEDICINE

## 2024-02-03 PROCEDURE — 25810000003 SODIUM CHLORIDE 0.9 % SOLUTION: Performed by: EMERGENCY MEDICINE

## 2024-02-03 PROCEDURE — 25010000002 ONDANSETRON PER 1 MG: Performed by: EMERGENCY MEDICINE

## 2024-02-03 PROCEDURE — 85025 COMPLETE CBC W/AUTO DIFF WBC: CPT | Performed by: EMERGENCY MEDICINE

## 2024-02-03 PROCEDURE — 90471 IMMUNIZATION ADMIN: CPT | Performed by: EMERGENCY MEDICINE

## 2024-02-03 RX ORDER — ONDANSETRON 4 MG/1
4 TABLET, FILM COATED ORAL EVERY 6 HOURS
Qty: 15 TABLET | Refills: 0 | Status: SHIPPED | OUTPATIENT
Start: 2024-02-03

## 2024-02-03 RX ORDER — DOXYCYCLINE 100 MG/1
100 CAPSULE ORAL 2 TIMES DAILY
Qty: 14 CAPSULE | Refills: 0 | Status: SHIPPED | OUTPATIENT
Start: 2024-02-03 | End: 2024-02-10

## 2024-02-03 RX ORDER — ONDANSETRON 2 MG/ML
4 INJECTION INTRAMUSCULAR; INTRAVENOUS ONCE
Status: COMPLETED | OUTPATIENT
Start: 2024-02-03 | End: 2024-02-03

## 2024-02-03 RX ADMIN — ONDANSETRON 4 MG: 2 INJECTION INTRAMUSCULAR; INTRAVENOUS at 12:37

## 2024-02-03 RX ADMIN — TETANUS TOXOID, REDUCED DIPHTHERIA TOXOID AND ACELLULAR PERTUSSIS VACCINE, ADSORBED 0.5 ML: 5; 2.5; 8; 8; 2.5 SUSPENSION INTRAMUSCULAR at 12:38

## 2024-02-03 RX ADMIN — SODIUM CHLORIDE 1000 ML: 9 INJECTION, SOLUTION INTRAVENOUS at 12:38
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
## 2024-02-09 ENCOUNTER — TRANSCRIBE ORDERS (OUTPATIENT)
Dept: ADMINISTRATIVE | Facility: HOSPITAL | Age: 44
End: 2024-02-09
Payer: COMMERCIAL

## 2024-02-09 ENCOUNTER — HOSPITAL ENCOUNTER (OUTPATIENT)
Dept: CT IMAGING | Facility: HOSPITAL | Age: 44
Discharge: HOME | End: 2024-02-09
Admitting: INTERNAL MEDICINE
Payer: COMMERCIAL

## 2024-02-09 DIAGNOSIS — R10.9 FLANK PAIN: Primary | ICD-10-CM

## 2024-02-09 DIAGNOSIS — R10.9 FLANK PAIN: ICD-10-CM

## 2024-02-09 PROCEDURE — 74176 CT ABD & PELVIS W/O CONTRAST: CPT

## 2024-02-14 ENCOUNTER — TELEPHONE (OUTPATIENT)
Dept: NEUROLOGY | Age: 44
End: 2024-02-14

## 2024-02-14 NOTE — TELEPHONE ENCOUNTER
RICARDO MOCTEZUMA (Key: [id number])  PA Case ID #: [id number]  Need Help? Call us at [phone]  Outcome  Approved today  Your PA request has been approved. Additional information will be provided in the approval communication.  Authorization Expiration Date: 2/12/2025  Drug  Nurtec 75MG dispersible tablets    Form  [facility] Electronic PA Form (2017 [employer])

## 2024-02-14 NOTE — TELEPHONE ENCOUNTER
RICARDO MOCTEZUMA (Key: [id number])  PA Case ID #: [id number]  Need Help? Call us at [phone]  Status  Sent to Plan today  Drug  Nurtec 75MG dispersible tablets    Form  [facility] Electronic [place] Form (2017 N[employer])

## 2024-03-04 RX ORDER — UBROGEPANT 100 MG/1
TABLET ORAL
Qty: 10 TABLET | Refills: 2 | Status: SHIPPED | OUTPATIENT
Start: 2024-03-04
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
## 2024-03-04 NOTE — TELEPHONE ENCOUNTER
Requested Prescriptions     Pending Prescriptions Disp Refills    UBRELVY 100 MG TABS [Pharmacy Med Name: UBRELVY 100 MG TABLET] 10 tablet 2     Sig: TAKE 1 TABLET AT THE ONSET OF MIGRAINE. MAY REPEAT ONCE IN 2 HOURS. IF NO IMPROVEMENT. DO NOT EXCEED 2 TABLETS IN 24 HOURS       Last Office Visit: 4/19/2023  Next Office Visit: 4/22/2024  Last Medication Refill: 10/18/2023   With 3 RF

## 2024-04-22 ENCOUNTER — OFFICE VISIT (OUTPATIENT)
Dept: NEUROLOGY | Age: 44
End: 2024-04-22
Payer: COMMERCIAL

## 2024-04-22 VITALS
HEART RATE: 82 BPM | SYSTOLIC BLOOD PRESSURE: 121 MMHG | BODY MASS INDEX: 30.36 KG/M2 | HEIGHT: 62 IN | DIASTOLIC BLOOD PRESSURE: 78 MMHG | WEIGHT: 165 LBS | OXYGEN SATURATION: 100 %

## 2024-04-22 DIAGNOSIS — G43.009 MIGRAINE WITHOUT AURA AND WITHOUT STATUS MIGRAINOSUS, NOT INTRACTABLE: Primary | ICD-10-CM

## 2024-04-22 PROCEDURE — 99213 OFFICE O/P EST LOW 20 MIN: CPT | Performed by: NURSE PRACTITIONER

## 2024-04-22 RX ORDER — UBROGEPANT 100 MG/1
TABLET ORAL
Qty: 10 TABLET | Refills: 2 | Status: SHIPPED | OUTPATIENT
Start: 2024-04-22

## 2024-04-22 RX ORDER — GALCANEZUMAB 120 MG/ML
120 INJECTION, SOLUTION SUBCUTANEOUS
Qty: 1 ADJUSTABLE DOSE PRE-FILLED PEN SYRINGE | Refills: 11 | Status: SHIPPED | OUTPATIENT
Start: 2024-04-22

## 2024-04-22 NOTE — PROGRESS NOTES
REVIEW OF SYSTEMS    Constitutional: []Fever []Sweats []Chills [] Recent Injury [x] Denies all unless marked  HEENT:[]Headache  [] Head Injury [] Hearing Loss  [] Sore Throat  [] Ear Ache [x] Denies all unless marked  Spine:  [] Neck pain  [] Back pain  [] Sciaticia  [x] Denies all unless marked  Cardiovascular:[]Heart Disease []Palpitations [] Chest Pain   [x] Denies all unless marked  Pulmonary: []Shortness of Breath []Cough   [x] Denies all unless marked  Psychiatric/Behavioral:[] Depression [] Anxiety [x] Denies all unless marked  Gastrointestinal: []Nausea  []Vomiting  []Abdominal Pain  []Constipation  []Diarrhea  [x] Denies all unless marked  Genitourinary:   [] Frequency  [] Urgency  [] Dysuria [] Incontinence  [x] Denies all unless marked  Extremities: []Pain  []Swelling  [x] Denies all unless marked  Musculoskeletal: [] Myalgias  [] Joint Pain  [] Arthritis [] Muscle Cramps [] Muscle Twitches  [x] Denies all unless marked  Sleep: []Insomnia[]Snoring []Restless Legs  []Sleep Apnea  []Daytime Sleepiness  [x] Denies all unless marked  Skin:[] Rash [] Color Change [x] Denies all unless marked   Neurological:[]Visual Disturbance [] Memory Loss []Loss of Balance []Slurred Speech []Weakness []Seizures  [] Dizziness [x] Denies all unless marked      
accessory muscles  CTA  Musculoskeletal -   No significant wasting of muscles noted  Gait as below, see gait exam in the neurologic exam  Muscle strength, tone, stability as below.   No bony deformities  Skin -   Warm, dry, and intact to inspection and palpation.    No rash, erythema, or pallor  Psychiatric -   Mood, affect, and behavior appear normal    Memory as below see mental status examination in the neurologic exam      NEUROLOGICAL EXAM    Mental status   [x]Awake, alert, oriented   [x]Affect attention and concentration appear appropriate  [x]Recent and remote memory appears unremarkable  [x]Speech normal without dysarthria or aphasia, comprehension and repetition intact.   COMMENTS:    Cranial Nerves [x]No VF deficit to confrontation,  no papilledema on fundoscopic exam.  [x]PERRLA, EOMI, no nystagmus, conjugate eye movements, no ptosis  [x]Face symmetric  [x]Facial sensation intact  [x]Tongue midline no atrophy or fasciculations present  [x]Palate midline, hearing to finger rub normal bilaterally  [x]Shoulder shrug and SCM testing normal bilaterally  COMMENTS:   Motor   [x]5/5 strength x 4 extremities  [x]Normal bulk and tone  [x]No tremor present  [x]No rigidity or bradykinesia noted  COMMENTS:   Sensory  [x]Sensation intact to light touch, pin prick, vibration, and proprioception BLE  []Sensation intact to light touch, pin prick, vibration, and proprioception BUE  COMMENTS:   Coordination [x]FTN normal bilaterally   [x]HTS normal bilaterally  [x]MELLISA normal bilaterally.   COMMENTS:   Reflexes  [x]Symmetric and non-pathological  [x]Toes down going bilaterally  [x]No clonus present  COMMENTS:   Gait                  [x]Normal steady gait    []Ataxic    []Spastic     []Magnetic     []Shuffling  COMMENTS:       LABS RECORD AND IMAGING REVIEW (As below and per HPI)    Records reviewed.     Prior MRI/MRA/EEG/CD normal.     EEG was normal as well.     ASSESSMENT:    Brandi Martin is a 44 y.o. female here for

## 2024-05-16 RX ORDER — UBROGEPANT 100 MG/1
TABLET ORAL
Qty: 30 TABLET | Refills: 1 | Status: SHIPPED | OUTPATIENT
Start: 2024-05-16

## 2024-05-16 NOTE — TELEPHONE ENCOUNTER
Requested Prescriptions     Pending Prescriptions Disp Refills    UBRELVY 100 MG TABS [Pharmacy Med Name: UBRELVY 100 MG TABLET] 30 tablet 1     Sig: TAKE 1 TABLET AT THE ONSET OF MIGRAINE. MAY REPEAT ONCE IN 2 HOURS. IF NO IMPROVEMENT. DO NOT EXCEED 2 TABLETS IN 24 HOURS       Last Office Visit: 4/22/2024  Next Office Visit: Visit date not found  Last Medication Refill: 04/22/2024

## 2024-05-19 ENCOUNTER — PATIENT MESSAGE (OUTPATIENT)
Dept: NEUROLOGY | Age: 44
End: 2024-05-19

## 2024-05-20 RX ORDER — UBROGEPANT 100 MG/1
TABLET ORAL
Qty: 30 TABLET | Refills: 1 | OUTPATIENT
Start: 2024-05-20

## 2024-06-05 ENCOUNTER — TELEPHONE (OUTPATIENT)
Dept: NEUROLOGY | Age: 44
End: 2024-06-05

## 2024-06-05 NOTE — TELEPHONE ENCOUNTER
Pt called requesting update on ubrelvy PA. Pt voiced per her kit she has been denied. Neither of us has the actual denial however she is going to send it to me once she receives. I will appeal this as soon as it is received.

## 2024-06-11 ENCOUNTER — TRANSCRIBE ORDERS (OUTPATIENT)
Dept: GENERAL RADIOLOGY | Facility: HOSPITAL | Age: 44
End: 2024-06-11
Payer: COMMERCIAL

## 2024-06-11 ENCOUNTER — HOSPITAL ENCOUNTER (OUTPATIENT)
Dept: GENERAL RADIOLOGY | Facility: HOSPITAL | Age: 44
Discharge: HOME | End: 2024-06-11
Admitting: NURSE PRACTITIONER
Payer: COMMERCIAL

## 2024-06-11 DIAGNOSIS — R50.9 HYPERTHERMIA-INDUCED DEFECT: ICD-10-CM

## 2024-06-11 DIAGNOSIS — R05.1 ACUTE COUGH: ICD-10-CM

## 2024-06-11 DIAGNOSIS — R05.1 ACUTE COUGH: Primary | ICD-10-CM

## 2024-06-11 PROCEDURE — 71046 X-RAY EXAM CHEST 2 VIEWS: CPT

## 2024-07-11 ENCOUNTER — PATIENT MESSAGE (OUTPATIENT)
Dept: NEUROLOGY | Age: 44
End: 2024-07-11

## 2024-09-23 ENCOUNTER — TELEPHONE (OUTPATIENT)
Dept: NEUROLOGY | Age: 44
End: 2024-09-23

## 2025-02-06 DIAGNOSIS — G43.009 MIGRAINE WITHOUT AURA AND WITHOUT STATUS MIGRAINOSUS, NOT INTRACTABLE: Primary | ICD-10-CM

## 2025-02-07 DIAGNOSIS — G43.009 MIGRAINE WITHOUT AURA AND WITHOUT STATUS MIGRAINOSUS, NOT INTRACTABLE: Primary | ICD-10-CM

## 2025-02-07 NOTE — TELEPHONE ENCOUNTER
Requested Prescriptions     Pending Prescriptions Disp Refills    Galcanezumab-gnlm (EMGALITY) 120 MG/ML SOAJ 1 Adjustable Dose Pre-filled Pen Syringe 11     Sig: Inject 120 mg into the skin every 30 days       Last Office Visit: 4/22/2024  Next Office Visit: 4/22/2025  Last Medication Refill:4-

## 2025-02-10 RX ORDER — GALCANEZUMAB 120 MG/ML
120 INJECTION, SOLUTION SUBCUTANEOUS
Qty: 1 ADJUSTABLE DOSE PRE-FILLED PEN SYRINGE | Refills: 11 | Status: SHIPPED | OUTPATIENT
Start: 2025-02-10